# Patient Record
Sex: FEMALE | Race: WHITE | NOT HISPANIC OR LATINO | Employment: FULL TIME | URBAN - METROPOLITAN AREA
[De-identification: names, ages, dates, MRNs, and addresses within clinical notes are randomized per-mention and may not be internally consistent; named-entity substitution may affect disease eponyms.]

---

## 2017-10-12 ENCOUNTER — HISTORICAL (OUTPATIENT)
Dept: BARIATRICS | Facility: HOSPITAL | Age: 52
End: 2017-10-12

## 2017-11-01 ENCOUNTER — HISTORICAL (OUTPATIENT)
Dept: BARIATRICS | Facility: HOSPITAL | Age: 52
End: 2017-11-01

## 2021-11-01 ENCOUNTER — HISTORICAL (OUTPATIENT)
Dept: ADMINISTRATIVE | Facility: HOSPITAL | Age: 56
End: 2021-11-01

## 2022-04-07 ENCOUNTER — HISTORICAL (OUTPATIENT)
Dept: ADMINISTRATIVE | Facility: HOSPITAL | Age: 57
End: 2022-04-07

## 2022-04-23 VITALS
DIASTOLIC BLOOD PRESSURE: 98 MMHG | HEIGHT: 60 IN | SYSTOLIC BLOOD PRESSURE: 164 MMHG | WEIGHT: 255 LBS | BODY MASS INDEX: 50.06 KG/M2

## 2022-05-01 NOTE — HISTORICAL [facility]
This is a historical note converted from [place]. Formatting and pictures may have been removed.  Please reference [place] for original formatting and attached multimedia. Chief Complaint  pt here for YELENA knee pain, has a hx of meniscus repair sx unknown year by Dr. Cooper. both knees have bothered her for about 10 years now  History of Present Illness  56-year-old female presents office today for evaluation of her bilateral knee pain.? This been present for many years. ?Her pain is medial sided?and worse with weightbearing, activity and range of motion. ?She does have a history of?right knee meniscus repair?done many years ago.? She has had?viscosupplementation injections in the past with no significant pain relief.? She denies any history of injury  Review of Systems  Systemic: No fever, no chills, and no recent weight change.  Head: No headache - frequent.  Eyes: No vision problems.  Otolarnygeal: No hearing loss, no earache, no epistaxis, no hoarseness, and no tooth pain. Gums normal.  Cardiovascular: No chest pain or discomfort and no palpitations.  Pulmonary: No pulmonary symptoms - no dyspnea, no shortness of breath  Gastrointestinal: Appetite not decreased. No dysphagia and no constant eructation. No nausea, no vomiting, no abdominal pain, no hematochezia.  Genitourinary: No genitourinary symptoms - No urinary hesitancy. No urinary loss of control - no burning sensation during urination.  Musculoskeletal: No calf muscle cramps and no localized soft tissue swelling  Neurological: No fainting and no convulsions.  Psychological: no depression.  Skin: No rash.  Physical Exam  Vitals & Measurements  T:?37? ?C (Oral)? HR:?75(Peripheral)? BP:?164/98?  HT:?152.00?cm? WT:?115.660?kg? BMI:?50.06?  PHYSICAL FINDINGS  Cardiovascular:  Arterial Pulses: Posterior tibialis pulses were normal. Dorsalis pedis pulses were normal right.  Musculoskeletal System:  Thigh:  ?Thigh: Thigh showed quadriceps  atrophy.  Knee:  Right?knee:  Grade effusion 1  Genu varum. Patella demonstrated crepitus. Anteromedial aspect was tender on palpation. Medial aspect was tender on palpation. Medial collateral ligament was tender on palpation. Active motion.  Right?knee:  Knee Motion: Value  Active flexion?115 degrees  Active extension?5 degrees  Pain was elicited by flexion. No erythema. No warmth. No medial instability. No lateral instability. No one plane medial (straight) instability. No one plane lateral (straight) instability. A Lachman test did not demonstrate one plane anterior instability.  Neurological:  Gait And Stance: A right-sided antalgic gait was observed.  ???  ???  TESTS  Imaging:  X-Ray Knee:  A complete knee x-ray with standing views was performed -of?right knee.  AP and lateral view x-rays of the Right knee with sunrise view of the patella were performed -of?right knee.  ???  ???  IMPRESSIONS RADIOLOGY TEST  Narrowing of the joint space bone on bone?in the medial patellofemoral compartments, and osteophytes arising from the?right knee.  Kellgren Justin?grade 4 changes  After verbal consent right knee was prepped in sterile fashion. 2 mL of lidocaine and 2 mL of betamethasone was injected intra-articularly on a 25-gauge needle. Patient tolerated the procedure well.  ?   PHYSICAL FINDINGS  Cardiovascular:  Arterial Pulses: Posterior tibialis pulses were normal. Dorsalis pedis pulses were normal left.  Musculoskeletal System:  Thigh:  ?Thigh: Thigh showed quadriceps atrophy.  Knee:  Left?knee:  Grade effusion 1  Genu varum. Patella demonstrated crepitus. Anteromedial aspect was tender on palpation. Medial aspect was tender on palpation. Medial collateral ligament was tender on palpation. Active motion.  Left?knee:  Knee Motion: Value  Active flexion?115 degrees  Active extension?5 degrees  Pain was elicited by flexion. No erythema. No warmth. No medial instability. No lateral instability. No one plane medial  (straight) instability. No one plane lateral (straight) instability. A Lachman test did not demonstrate one plane anterior instability.  Neurological:  Gait And Stance: A left-sided antalgic gait was observed.  ???  ???  TESTS  Imaging:  X-Ray Knee:  A complete knee x-ray with standing views was performed -of?left knee.  AP and lateral view x-rays of the Right knee with sunrise view of the patella were performed -of?left knee.  ???  ???  IMPRESSIONS RADIOLOGY TEST  Narrowing of the joint space bone on bone?in the medial patellofemoral compartments, and osteophytes arising from the?left knee.  Kellgren Justin?grade 4 changes  After verbal consent left knee was prepped in sterile fashion. 2 mL of lidocaine and 2 mL of betamethasone was injected intra-articularly on a 25-gauge needle. Patient tolerated the procedure well.  Assessment/Plan  1.?Osteoarthritis of both knees?M17.0  ?Discussed with the patient today that she has advanced osteoarthritis of both knees. ?Currently her BMI is 50.? To become a surgical candidate for total knee arthroplasty we need to have her BMI?40 or below.? Recommend corticosteroid injections to both knees today and referral to [facility]?medical weight loss center. ?Follow-up with us as needed  Ordered:  betamethasone, 24 mg, Intra-Articular, Once, first dose 11/01/21 11:00:00 CDT, stop date 11/01/21 11:00:00 CDT  Lidocaine inj., 4 mL, Intra-Articular, Once, first dose 11/01/21 10:53:00 CDT, stop date 11/01/21 10:53:00 CDT  Asp/Inj (Bilateral) Major Jnt/Bursa [id number], 11/01/21 10:53:00 CDT, [facility], Routine, 11/01/21 10:53:00 CDT, Osteoarthritis of both knees  Morbid obesity  Clinic Follow-up PRN, 11/01/21 10:52:00 CDT, Future Order, [facility]  ?  2.?Morbid obesity?E66.01  Ordered:  betamethasone, 24 mg, Intra-Articular, Once, first dose 11/01/21 11:00:00 CDT, stop date 11/01/21 11:00:00 CDT  Lidocaine inj., 4 mL, Intra-Articular, Once, first dose 11/01/21  10:53:00 CDT, stop date 11/01/21 10:53:00 CDT  Asp/Inj (Bilateral) Major Jnt/Bursa [id number], 11/01/21 10:53:00 CDT, [facility], Routine, 11/01/21 10:53:00 CDT, Osteoarthritis of both knees  Morbid obesity  ?  Referrals  Clinic Follow-up PRN, 11/01/21 10:52:00 CDT, Future Order, [facility]   Problem List/Past Medical History  Ongoing  Acid reflux  Breast cancer  Breast cancer in female  Family history of breast cancer  HTN - Hypertension  Morbid obesity  Obesity  Osteoarthritis of both knees  Historical  No qualifying data  Procedure/Surgical History  Incision and drainage of hematoma, seroma or fluid collection (11/17/2020)  Biopsy or excision of lymph node(s); open, deep axillary node(s) (10/30/2020)  Mastectomy, partial (eg, lumpectomy, tylectomy, quadrantectomy, segmentectomy); (10/30/2020)  PARTIAL RIGHT MASTECTOMY NEEDLE LOC SENTINEL NODE BX (10/30/2020)  Vaginal hysterectomy, for uterus 250 g or less; (2004)  HYSTERECTOMY  right breast mastectomy - November 2020  Right knee menicus tear -- unknown year -- Dr. Cooper   Medications  Arimidex 1 mg oral tablet, 1 mg= 1 tab(s), Oral, Daily, 3 refills  benazepril 20 mg oral tablet, 20 mg= 1 tab(s), Oral, Daily  BiDil 20 mg-37.5 mg oral tablet, 1 tab(s), Oral, BID  Celestone, 24 mg, Intra-Articular, Once  D3-50 1250 mcg (50,000 intl units) oral capsule, 1250 mcg= 1 cap(s), Oral, qWeek  lidocaine 2% injectable solution, 4 mL, Intra-Articular, Once  metoprolol tartrate 25 mg oral tab, 25 mg= 1 tab(s), Oral, BID  mometasone 50 mcg/inh nasal spray, 1 spray(s), Both Nostrils, qPM  Tylenol Extra Strength 500 mg oral tablet, 1000 mg= 2 tab(s), Oral, q4hr, PRN  Allergies  No Known Medication Allergies  Social History  Abuse/Neglect  No, No, Yes, 11/01/2021  Alcohol  Never, 04/05/2021  Nutrition/Health  Regular, Good, Diet restrictions: none., 04/05/2021  Substance Use  Never, 04/05/2021  Tobacco  Never (less than 100 in lifetime), N/A,  11/01/2021  Family History  Diabetes mellitus type 2: Father.  Hypertension.: Mother and Father.  Primary malignant neoplasm of breast: Father.  Primary malignant neoplasm of female genital organ: Aunt and Aunt.  Thyroid cancer: Grandmother.  Health Maintenance  Health Maintenance  ???Pending?(in the next year)  ??? ??Due?  ??? ? ? ?Aspirin Therapy for CVD Prevention due??11/01/21??and every 1??year(s)  ??? ? ? ?Hypertension Management-Education due??11/01/21??and every 1??year(s)  ??? ? ? ?Zoster Vaccine due??11/01/21??Unknown Frequency  ??? ??Refused?  ??? ? ? ?Tetanus Vaccine due??11/01/21??and every 10??year(s)  ??? ??Due In Future?  ??? ? ? ?ADL Screening not due until??12/04/21??and every 1??year(s)  ??? ? ? ?Obesity Screening not due until??01/01/22??and every 1??year(s)  ??? ? ? ?Alcohol Misuse Screening not due until??01/02/22??and every 1??year(s)  ??? ? ? ?Hypertension Management-BMP not due until??07/05/22??and every 1??year(s)  ???Satisfied?(in the past 1 year)  ??? ??Satisfied?  ??? ? ? ?ADL Screening on??12/04/20.??Satisfied by Linsey Longo LPN  ??? ? ? ?Alcohol Misuse Screening on??10/07/21.??Satisfied by Melanie Astudillo  ??? ? ? ?Blood Pressure Screening on??11/01/21.??Satisfied by Michelle Rosa  ??? ? ? ?Body Mass Index Check on??11/01/21.??Satisfied by Michelle Rosa  ??? ? ? ?Depression Screening on??11/01/21.??Satisfied by Michelle Rosa  ??? ? ? ?Diabetes Screening on??10/07/21.??Satisfied by Mckenzie Scott  ??? ? ? ?Hypertension Management-Blood Pressure on??11/01/21.??Satisfied by Michelle Rosa  ??? ? ? ?Influenza Vaccine on??11/01/21.??Satisfied by Michelle Rosa  ??? ? ? ?Lipid Screening on??05/05/21.??Satisfied by Stephanie Coley  ??? ? ? ?Obesity Screening on??11/01/21.??Satisfied by Michelle Rosa  ??? ??Refused?  ??? ? ? ?Alcohol Misuse Screening on??04/05/21.??Recorded by Nathan Lobato LPN??Reason: Patient Refuses  ??? ? ? ?Cervical  Cancer Screening on??04/05/21.??Recorded by Nathan Lobato LPN??Reason: Patient Refuses  ??? ? ? ?Colorectal Screening on??04/05/21.??Recorded by Nathan Lobato LPN??Reason: Patient Refuses  ??? ? ? ?Tetanus Vaccine on??04/05/21.??Recorded by Nathan Lobato LPN??Reason: Patient Refuses  ??? ? ? ?Zoster Vaccine on??04/05/21.??Recorded by Nathan Lobato LPN??Reason: Patient Refuses  ?

## 2022-06-03 DIAGNOSIS — M25.561 ACUTE BILATERAL KNEE PAIN: Primary | ICD-10-CM

## 2022-06-03 DIAGNOSIS — M25.562 ACUTE BILATERAL KNEE PAIN: Primary | ICD-10-CM

## 2022-06-23 ENCOUNTER — HOSPITAL ENCOUNTER (OUTPATIENT)
Dept: RADIOLOGY | Facility: CLINIC | Age: 57
Discharge: HOME | End: 2022-06-23
Attending: ORTHOPAEDIC SURGERY
Payer: COMMERCIAL

## 2022-06-23 ENCOUNTER — OFFICE VISIT (OUTPATIENT)
Dept: ORTHOPEDICS | Facility: CLINIC | Age: 57
End: 2022-06-23
Payer: COMMERCIAL

## 2022-06-23 VITALS
HEIGHT: 61 IN | WEIGHT: 248 LBS | BODY MASS INDEX: 46.82 KG/M2 | SYSTOLIC BLOOD PRESSURE: 171 MMHG | HEART RATE: 72 BPM | DIASTOLIC BLOOD PRESSURE: 101 MMHG

## 2022-06-23 DIAGNOSIS — M25.562 PAIN IN BOTH KNEES, UNSPECIFIED CHRONICITY: Primary | ICD-10-CM

## 2022-06-23 DIAGNOSIS — M25.561 ACUTE BILATERAL KNEE PAIN: ICD-10-CM

## 2022-06-23 DIAGNOSIS — M25.561 PAIN IN BOTH KNEES, UNSPECIFIED CHRONICITY: Primary | ICD-10-CM

## 2022-06-23 DIAGNOSIS — M17.0 PRIMARY OSTEOARTHRITIS OF BOTH KNEES: ICD-10-CM

## 2022-06-23 DIAGNOSIS — M25.562 ACUTE BILATERAL KNEE PAIN: ICD-10-CM

## 2022-06-23 PROCEDURE — 20610 DRAIN/INJ JOINT/BURSA W/O US: CPT | Mod: 50,,, | Performed by: ORTHOPAEDIC SURGERY

## 2022-06-23 PROCEDURE — 3008F BODY MASS INDEX DOCD: CPT | Mod: CPTII,,, | Performed by: ORTHOPAEDIC SURGERY

## 2022-06-23 PROCEDURE — 4010F PR ACE/ARB THEARPY RXD/TAKEN: ICD-10-PCS | Mod: CPTII,,, | Performed by: ORTHOPAEDIC SURGERY

## 2022-06-23 PROCEDURE — 3077F SYST BP >= 140 MM HG: CPT | Mod: CPTII,,, | Performed by: ORTHOPAEDIC SURGERY

## 2022-06-23 PROCEDURE — 1160F RVW MEDS BY RX/DR IN RCRD: CPT | Mod: CPTII,,, | Performed by: ORTHOPAEDIC SURGERY

## 2022-06-23 PROCEDURE — 3080F PR MOST RECENT DIASTOLIC BLOOD PRESSURE >= 90 MM HG: ICD-10-PCS | Mod: CPTII,,, | Performed by: ORTHOPAEDIC SURGERY

## 2022-06-23 PROCEDURE — 73564 X-RAY EXAM KNEE 4 OR MORE: CPT | Mod: ,,, | Performed by: ORTHOPAEDIC SURGERY

## 2022-06-23 PROCEDURE — 3008F PR BODY MASS INDEX (BMI) DOCUMENTED: ICD-10-PCS | Mod: CPTII,,, | Performed by: ORTHOPAEDIC SURGERY

## 2022-06-23 PROCEDURE — 1159F MED LIST DOCD IN RCRD: CPT | Mod: CPTII,,, | Performed by: ORTHOPAEDIC SURGERY

## 2022-06-23 PROCEDURE — 73564 PR  X-RAY KNEE 4+ VIEW: ICD-10-PCS | Mod: ,,, | Performed by: ORTHOPAEDIC SURGERY

## 2022-06-23 PROCEDURE — 4010F ACE/ARB THERAPY RXD/TAKEN: CPT | Mod: CPTII,,, | Performed by: ORTHOPAEDIC SURGERY

## 2022-06-23 PROCEDURE — 99213 PR OFFICE/OUTPT VISIT, EST, LEVL III, 20-29 MIN: ICD-10-PCS | Mod: 25,,, | Performed by: ORTHOPAEDIC SURGERY

## 2022-06-23 PROCEDURE — 99213 OFFICE O/P EST LOW 20 MIN: CPT | Mod: 25,,, | Performed by: ORTHOPAEDIC SURGERY

## 2022-06-23 PROCEDURE — 3080F DIAST BP >= 90 MM HG: CPT | Mod: CPTII,,, | Performed by: ORTHOPAEDIC SURGERY

## 2022-06-23 PROCEDURE — 1160F PR REVIEW ALL MEDS BY PRESCRIBER/CLIN PHARMACIST DOCUMENTED: ICD-10-PCS | Mod: CPTII,,, | Performed by: ORTHOPAEDIC SURGERY

## 2022-06-23 PROCEDURE — 20610 LARGE JOINT ASPIRATION/INJECTION: BILATERAL KNEE: ICD-10-PCS | Mod: 50,,, | Performed by: ORTHOPAEDIC SURGERY

## 2022-06-23 PROCEDURE — 3077F PR MOST RECENT SYSTOLIC BLOOD PRESSURE >= 140 MM HG: ICD-10-PCS | Mod: CPTII,,, | Performed by: ORTHOPAEDIC SURGERY

## 2022-06-23 PROCEDURE — 1159F PR MEDICATION LIST DOCUMENTED IN MEDICAL RECORD: ICD-10-PCS | Mod: CPTII,,, | Performed by: ORTHOPAEDIC SURGERY

## 2022-06-23 RX ORDER — LIDOCAINE HYDROCHLORIDE 20 MG/ML
3 INJECTION, SOLUTION INFILTRATION; PERINEURAL
Status: DISCONTINUED | OUTPATIENT
Start: 2022-06-23 | End: 2022-06-23 | Stop reason: HOSPADM

## 2022-06-23 RX ORDER — BETAMETHASONE SODIUM PHOSPHATE AND BETAMETHASONE ACETATE 3; 3 MG/ML; MG/ML
12 INJECTION, SUSPENSION INTRA-ARTICULAR; INTRALESIONAL; INTRAMUSCULAR; SOFT TISSUE
Status: DISCONTINUED | OUTPATIENT
Start: 2022-06-23 | End: 2022-06-23 | Stop reason: HOSPADM

## 2022-06-23 RX ORDER — ISOSORBIDE DINITRATE 20 MG/1
20 TABLET ORAL 2 TIMES DAILY
Status: ON HOLD | COMMUNITY
Start: 2022-06-01 | End: 2023-12-01

## 2022-06-23 RX ORDER — BENAZEPRIL HYDROCHLORIDE 20 MG/1
20 TABLET ORAL DAILY
COMMUNITY
Start: 2022-06-11

## 2022-06-23 RX ORDER — ANASTROZOLE 1 MG/1
1 TABLET ORAL DAILY
COMMUNITY
Start: 2022-05-27

## 2022-06-23 RX ORDER — METOPROLOL SUCCINATE 25 MG/1
25 TABLET, EXTENDED RELEASE ORAL DAILY
Status: ON HOLD | COMMUNITY
End: 2023-12-01 | Stop reason: CLARIF

## 2022-06-23 RX ADMIN — LIDOCAINE HYDROCHLORIDE 3 MG: 20 INJECTION, SOLUTION INFILTRATION; PERINEURAL at 01:06

## 2022-06-23 RX ADMIN — BETAMETHASONE SODIUM PHOSPHATE AND BETAMETHASONE ACETATE 12 MG: 3; 3 INJECTION, SUSPENSION INTRA-ARTICULAR; INTRALESIONAL; INTRAMUSCULAR; SOFT TISSUE at 01:06

## 2022-06-23 NOTE — PROGRESS NOTES
Subjective:    CC: Pain (YELENA Knee pain, referred by Dr. Childers, pt states pain is a aching pain, hurts all the time, sometimes keeps pt up at night, pt stated pain started about years ago,)       HPI:  Patient comes in today complaining of bilateral knee pain right equal to left.  She has been having pain for many years.  She had has a longstanding history of arthritis.  She has pain when getting up from a seated position long standing walking.  She denies any new trauma she denies other complaints.    ROS: Refer to HPI for pertinent ROS. All other 12 point systems negative.    Objective:    Physical Exam:  The patient is well-nourished, well-developed, in no apparent distress, pleasant and cooperative. Examination of the bilateral lower extremities,  compartments are soft and warm. Skin is intact. There are no signs or symptoms of DVT or infection. There is no obvious joint effusion. There is no erythema. Tenderness to palpation along the mediolateral joint line, right knee range of motion is 0-100; left knee range of motion is 0-100. The knee is stable to stressing with varus and valgus. Negative anterior and posterior drawer.   Negative Lachman´s.  Negative Kenna's test. Patella grind is positive, negative for apprehension.  Patient is neurovascularly intact distally.      Images:  X-rays three views of left and right knee demonstrate severe bone-on-bone arthritis with varus deformity. Images Reviewed and discussed with patient.    Assessment:  1. Acute bilateral knee pain  - Ambulatory referral/consult to Orthopedics    2. Pain in both knees, unspecified chronicity  - X-Ray Knee Complete 4 Or More Views Bilat; Future  - X-Ray Knee Complete 4 Or More Views Bilat    3. Primary osteoarthritis of both knees  - Large Joint Aspiration/Injection: bilateral knee  - betamethasone acetate-betamethasone sodium phosphate injection 12 mg  - LIDOcaine HCL 20 mg/ml (2%) injection 3 mg  - betamethasone acetate-betamethasone  sodium phosphate injection 12 mg  - LIDOcaine HCL 20 mg/ml (2%) injection 3 mg        Plan:   at this time we discussed her physical exam and x-ray findings.  She tolerated her steroid injection very well both knees we have discussed low-impact activities, we have discussed her elevated BMI, we will hold off on a knee replacement at this time.  I would like see back in 6 weeks to see how she is progressing.    Follow UP: Follow up in about 6 weeks (around 8/4/2022).    Large Joint Aspiration/Injection: bilateral knee    Date/Time: 6/23/2022 1:15 PM  Performed by: Tomas Gordillo MD  Authorized by: Tomas Gordillo MD     Consent Done?:  Yes (Verbal)  Indications:  Pain  Site marked: the procedure site was marked    Prep: patient was prepped and draped in usual sterile fashion    Approach:  Anterolateral  Location:  Knee  Laterality:  Bilateral  Site:  Bilateral knee  Medications (Right):  12 mg betamethasone acetate-betamethasone sodium phosphate 6 mg/mL; 3 mg LIDOcaine HCL 20 mg/ml (2%) 20 mg/mL (2 %)  Medications (Left):  12 mg betamethasone acetate-betamethasone sodium phosphate 6 mg/mL; 3 mg LIDOcaine HCL 20 mg/ml (2%) 20 mg/mL (2 %)  Patient tolerance:  Patient tolerated the procedure well with no immediate complications

## 2022-06-23 NOTE — PROCEDURES
Large Joint Aspiration/Injection: bilateral knee    Date/Time: 6/23/2022 1:15 PM  Performed by: Tomas Gordillo MD  Authorized by: Tomas Gordillo MD     Consent Done?:  Yes (Verbal)  Indications:  Pain  Site marked: the procedure site was marked    Prep: patient was prepped and draped in usual sterile fashion    Approach:  Anterolateral  Location:  Knee  Laterality:  Bilateral  Site:  Bilateral knee  Medications (Right):  12 mg betamethasone acetate-betamethasone sodium phosphate 6 mg/mL; 3 mg LIDOcaine HCL 20 mg/ml (2%) 20 mg/mL (2 %)  Medications (Left):  12 mg betamethasone acetate-betamethasone sodium phosphate 6 mg/mL; 3 mg LIDOcaine HCL 20 mg/ml (2%) 20 mg/mL (2 %)  Patient tolerance:  Patient tolerated the procedure well with no immediate complications

## 2022-08-04 ENCOUNTER — OFFICE VISIT (OUTPATIENT)
Dept: ORTHOPEDICS | Facility: CLINIC | Age: 57
End: 2022-08-04
Payer: COMMERCIAL

## 2022-08-04 VITALS — WEIGHT: 248 LBS | BODY MASS INDEX: 46.82 KG/M2 | HEIGHT: 61 IN

## 2022-08-04 DIAGNOSIS — M17.0 PRIMARY OSTEOARTHRITIS OF BOTH KNEES: Primary | ICD-10-CM

## 2022-08-04 PROCEDURE — 1160F PR REVIEW ALL MEDS BY PRESCRIBER/CLIN PHARMACIST DOCUMENTED: ICD-10-PCS | Mod: CPTII,,, | Performed by: ORTHOPAEDIC SURGERY

## 2022-08-04 PROCEDURE — 1159F MED LIST DOCD IN RCRD: CPT | Mod: CPTII,,, | Performed by: ORTHOPAEDIC SURGERY

## 2022-08-04 PROCEDURE — 3008F PR BODY MASS INDEX (BMI) DOCUMENTED: ICD-10-PCS | Mod: CPTII,,, | Performed by: ORTHOPAEDIC SURGERY

## 2022-08-04 PROCEDURE — 99213 OFFICE O/P EST LOW 20 MIN: CPT | Mod: ,,, | Performed by: ORTHOPAEDIC SURGERY

## 2022-08-04 PROCEDURE — 1159F PR MEDICATION LIST DOCUMENTED IN MEDICAL RECORD: ICD-10-PCS | Mod: CPTII,,, | Performed by: ORTHOPAEDIC SURGERY

## 2022-08-04 PROCEDURE — 4010F PR ACE/ARB THEARPY RXD/TAKEN: ICD-10-PCS | Mod: CPTII,,, | Performed by: ORTHOPAEDIC SURGERY

## 2022-08-04 PROCEDURE — 1160F RVW MEDS BY RX/DR IN RCRD: CPT | Mod: CPTII,,, | Performed by: ORTHOPAEDIC SURGERY

## 2022-08-04 PROCEDURE — 99213 PR OFFICE/OUTPT VISIT, EST, LEVL III, 20-29 MIN: ICD-10-PCS | Mod: ,,, | Performed by: ORTHOPAEDIC SURGERY

## 2022-08-04 PROCEDURE — 3008F BODY MASS INDEX DOCD: CPT | Mod: CPTII,,, | Performed by: ORTHOPAEDIC SURGERY

## 2022-08-04 PROCEDURE — 4010F ACE/ARB THERAPY RXD/TAKEN: CPT | Mod: CPTII,,, | Performed by: ORTHOPAEDIC SURGERY

## 2022-08-04 NOTE — PROGRESS NOTES
Subjective:    CC: Follow-up (YELENA KNEE INJECT 6/23/22 WITH RELIEF ,PAIN IS STILL THERE. NOT LIKE BEFORE )       HPI:  Patient returns today for repeat exam.  Patient is 6 weeks from her bilateral knee injections.  She states the injections did help, she states she still does have pain especially will get her of the same position long standing walking otherwise his improved.  We have discussed her elevated BMI.    ROS: Refer to HPI for pertinent ROS. All other 12 point systems negative.    Objective:    Physical Exam:  The patient is well-nourished, well-developed, in no apparent distress, pleasant and cooperative. Examination of the bilateral lower extremities,  compartments are soft and warm. Skin is intact. There are no signs or symptoms of DVT or infection. There is no obvious joint effusion. There is no erythema. Tenderness to palpation along the medial joint line, right knee range of motion is are 5-105; left knee range of motion is 5-105. The knee is stable to stressing with varus and valgus. Negative anterior and posterior drawer.   Negative Lachman´s.  Negative Kenna's test. Patella grind is positive, negative for apprehension.  Patient is neurovascularly intact distally.      Images: . Images Reviewed and discussed with patient.    Assessment:  1. Primary osteoarthritis of both knees        Plan:  At this time we discussed her physical exam and previous imaging findings.  She is improving nicely with conservative treatment.  We have discussed given her bone-on-bone arthritis she is likely headed towards a knee replacement though given her elevated BMI we will continue injections at this time.  She will continue low-impact activities.  I would like see back in 6 weeks for likely repeat injections to both knees.    Follow UP: Follow up in about 6 weeks (around 9/15/2022).

## 2022-09-15 ENCOUNTER — OFFICE VISIT (OUTPATIENT)
Dept: ORTHOPEDICS | Facility: CLINIC | Age: 57
End: 2022-09-15
Payer: COMMERCIAL

## 2022-09-15 VITALS — BODY MASS INDEX: 46.82 KG/M2 | WEIGHT: 248 LBS | HEIGHT: 61 IN

## 2022-09-15 DIAGNOSIS — M17.0 BILATERAL PRIMARY OSTEOARTHRITIS OF KNEE: Primary | ICD-10-CM

## 2022-09-15 PROCEDURE — 20610 LARGE JOINT ASPIRATION/INJECTION: BILATERAL KNEE: ICD-10-PCS | Mod: 50,,,

## 2022-09-15 PROCEDURE — 4010F PR ACE/ARB THEARPY RXD/TAKEN: ICD-10-PCS | Mod: CPTII,,,

## 2022-09-15 PROCEDURE — 1159F MED LIST DOCD IN RCRD: CPT | Mod: CPTII,,,

## 2022-09-15 PROCEDURE — 1160F RVW MEDS BY RX/DR IN RCRD: CPT | Mod: CPTII,,,

## 2022-09-15 PROCEDURE — 4010F ACE/ARB THERAPY RXD/TAKEN: CPT | Mod: CPTII,,,

## 2022-09-15 PROCEDURE — 20610 DRAIN/INJ JOINT/BURSA W/O US: CPT | Mod: 50,,,

## 2022-09-15 PROCEDURE — 3008F BODY MASS INDEX DOCD: CPT | Mod: CPTII,,,

## 2022-09-15 PROCEDURE — 1160F PR REVIEW ALL MEDS BY PRESCRIBER/CLIN PHARMACIST DOCUMENTED: ICD-10-PCS | Mod: CPTII,,,

## 2022-09-15 PROCEDURE — 3008F PR BODY MASS INDEX (BMI) DOCUMENTED: ICD-10-PCS | Mod: CPTII,,,

## 2022-09-15 PROCEDURE — 1159F PR MEDICATION LIST DOCUMENTED IN MEDICAL RECORD: ICD-10-PCS | Mod: CPTII,,,

## 2022-09-15 PROCEDURE — 99213 OFFICE O/P EST LOW 20 MIN: CPT | Mod: 25,,,

## 2022-09-15 PROCEDURE — 99213 PR OFFICE/OUTPT VISIT, EST, LEVL III, 20-29 MIN: ICD-10-PCS | Mod: 25,,,

## 2022-09-15 RX ORDER — BETAMETHASONE SODIUM PHOSPHATE AND BETAMETHASONE ACETATE 3; 3 MG/ML; MG/ML
12 INJECTION, SUSPENSION INTRA-ARTICULAR; INTRALESIONAL; INTRAMUSCULAR; SOFT TISSUE
Status: SHIPPED | OUTPATIENT
Start: 2022-09-15

## 2022-09-15 RX ORDER — LIDOCAINE HYDROCHLORIDE 20 MG/ML
3 INJECTION, SOLUTION INFILTRATION; PERINEURAL
Status: SHIPPED | OUTPATIENT
Start: 2022-09-15

## 2022-09-15 RX ADMIN — LIDOCAINE HYDROCHLORIDE 3 MG: 20 INJECTION, SOLUTION INFILTRATION; PERINEURAL at 09:09

## 2022-09-15 RX ADMIN — BETAMETHASONE SODIUM PHOSPHATE AND BETAMETHASONE ACETATE 12 MG: 3; 3 INJECTION, SUSPENSION INTRA-ARTICULAR; INTRALESIONAL; INTRAMUSCULAR; SOFT TISSUE at 09:09

## 2022-09-15 NOTE — PROCEDURES
Large Joint Aspiration/Injection: bilateral knee    Date/Time: 9/15/2022 9:15 AM  Performed by: Nava Granado PA-C  Authorized by: Nava Granado PA-C     Consent Done?:  Yes (Verbal)  Indications:  Pain  Site marked: the procedure site was marked    Prep: patient was prepped and draped in usual sterile fashion    Approach:  Anterolateral  Location:  Knee  Laterality:  Bilateral  Site:  Bilateral knee  Medications (Right):  12 mg betamethasone acetate-betamethasone sodium phosphate 6 mg/mL; 3 mg LIDOcaine HCL 20 mg/ml (2%) 20 mg/mL (2 %)  Medications (Left):  12 mg betamethasone acetate-betamethasone sodium phosphate 6 mg/mL; 3 mg LIDOcaine HCL 20 mg/ml (2%) 20 mg/mL (2 %)  Patient tolerance:  Patient tolerated the procedure well with no immediate complications

## 2022-09-15 NOTE — PROGRESS NOTES
Subjective:    CC: Follow-up (YELENA knee F/u, pt states injections have been a major help with pain relief, pt states exercsie has helped with relief as well, overall pt states doing better than before, )       HPI:  Patient presents to clinic for repeat evaluation of bilateral knees.  Right knee worse than left.  Last set of injections 6 months ago.  She states that these injection have been a major help however her pain is starting to return.  She is currently taking ibuprofen with some relief.  She continues to have pain with long standing, walking and bending.  She would like to repeat these injections when she can.  She states she is doing rowing and cycling for exercise.  No new complaints.    ROS: Refer to HPI for pertinent ROS. All other 12 point systems negative.    Objective:    There were no vitals filed for this visit.     Physical Exam: The patient is well-nourished, well-developed, in no apparent distress, pleasant and cooperative. Examination of the bilateral lower extremities,  compartments are soft and warm. Skin is intact. There are no signs or symptoms of DVT or infection. There is no obvious joint effusion. There is no erythema. Tenderness to palpation along the medial joint line, right knee range of motion is are 5-105; left knee range of motion is 5-105. The knee is stable to stressing with varus and valgus. Negative anterior and posterior drawer.   Negative Lachman´s.  Negative Knena's test. Patella grind is positive, negative for apprehension.  Patient is neurovascularly intact distally.    Images:  Previous Images Reviewed and discussed with patient.    Assessment:  1. Bilateral primary osteoarthritis of knee  - Large Joint Aspiration/Injection: bilateral knee  - LIDOcaine HCL 20 mg/ml (2%) injection 3 mg  - LIDOcaine HCL 20 mg/ml (2%) injection 3 mg  - betamethasone acetate-betamethasone sodium phosphate injection 12 mg  - betamethasone acetate-betamethasone sodium phosphate injection 12  mg       Plan:  Physical exam and previous imaging findings again discussed with patient.  Patient tolerated bilateral knee injections well today.  She will continue OTC anti-inflammatories and low-impact activities.  She will continue to work on lowering her BMI.  I would like to the patient back in 3 months to assess her progress with possible repeat injections.    Follow up: Follow up in about 3 months (around 12/15/2022).    Large Joint Aspiration/Injection: bilateral knee    Date/Time: 9/15/2022 9:15 AM  Performed by: Nava Granado PA-C  Authorized by: Nava Granado PA-C     Consent Done?:  Yes (Verbal)  Indications:  Pain  Site marked: the procedure site was marked    Prep: patient was prepped and draped in usual sterile fashion    Approach:  Anterolateral  Location:  Knee  Laterality:  Bilateral  Site:  Bilateral knee  Medications (Right):  12 mg betamethasone acetate-betamethasone sodium phosphate 6 mg/mL; 3 mg LIDOcaine HCL 20 mg/ml (2%) 20 mg/mL (2 %)  Medications (Left):  12 mg betamethasone acetate-betamethasone sodium phosphate 6 mg/mL; 3 mg LIDOcaine HCL 20 mg/ml (2%) 20 mg/mL (2 %)  Patient tolerance:  Patient tolerated the procedure well with no immediate complications

## 2022-12-15 ENCOUNTER — OFFICE VISIT (OUTPATIENT)
Dept: ORTHOPEDICS | Facility: CLINIC | Age: 57
End: 2022-12-15
Payer: COMMERCIAL

## 2022-12-15 VITALS — HEIGHT: 61 IN | BODY MASS INDEX: 47.58 KG/M2 | WEIGHT: 252 LBS

## 2022-12-15 DIAGNOSIS — M17.0 BILATERAL PRIMARY OSTEOARTHRITIS OF KNEE: Primary | ICD-10-CM

## 2022-12-15 PROCEDURE — 1159F PR MEDICATION LIST DOCUMENTED IN MEDICAL RECORD: ICD-10-PCS | Mod: CPTII,,, | Performed by: ORTHOPAEDIC SURGERY

## 2022-12-15 PROCEDURE — 20610 DRAIN/INJ JOINT/BURSA W/O US: CPT | Mod: 50,,, | Performed by: ORTHOPAEDIC SURGERY

## 2022-12-15 PROCEDURE — 1159F MED LIST DOCD IN RCRD: CPT | Mod: CPTII,,, | Performed by: ORTHOPAEDIC SURGERY

## 2022-12-15 PROCEDURE — 1160F RVW MEDS BY RX/DR IN RCRD: CPT | Mod: CPTII,,, | Performed by: ORTHOPAEDIC SURGERY

## 2022-12-15 PROCEDURE — 4010F ACE/ARB THERAPY RXD/TAKEN: CPT | Mod: CPTII,,, | Performed by: ORTHOPAEDIC SURGERY

## 2022-12-15 PROCEDURE — 20610 LARGE JOINT ASPIRATION/INJECTION: BILATERAL KNEE: ICD-10-PCS | Mod: 50,,, | Performed by: ORTHOPAEDIC SURGERY

## 2022-12-15 PROCEDURE — 4010F PR ACE/ARB THEARPY RXD/TAKEN: ICD-10-PCS | Mod: CPTII,,, | Performed by: ORTHOPAEDIC SURGERY

## 2022-12-15 PROCEDURE — 3008F BODY MASS INDEX DOCD: CPT | Mod: CPTII,,, | Performed by: ORTHOPAEDIC SURGERY

## 2022-12-15 PROCEDURE — 1160F PR REVIEW ALL MEDS BY PRESCRIBER/CLIN PHARMACIST DOCUMENTED: ICD-10-PCS | Mod: CPTII,,, | Performed by: ORTHOPAEDIC SURGERY

## 2022-12-15 PROCEDURE — 3008F PR BODY MASS INDEX (BMI) DOCUMENTED: ICD-10-PCS | Mod: CPTII,,, | Performed by: ORTHOPAEDIC SURGERY

## 2022-12-15 PROCEDURE — 99214 PR OFFICE/OUTPT VISIT, EST, LEVL IV, 30-39 MIN: ICD-10-PCS | Mod: 25,,, | Performed by: ORTHOPAEDIC SURGERY

## 2022-12-15 PROCEDURE — 99214 OFFICE O/P EST MOD 30 MIN: CPT | Mod: 25,,, | Performed by: ORTHOPAEDIC SURGERY

## 2022-12-15 RX ORDER — LIDOCAINE HYDROCHLORIDE 20 MG/ML
3 INJECTION, SOLUTION INFILTRATION; PERINEURAL
Status: DISCONTINUED | OUTPATIENT
Start: 2022-12-15 | End: 2022-12-15 | Stop reason: HOSPADM

## 2022-12-15 RX ORDER — BETAMETHASONE SODIUM PHOSPHATE AND BETAMETHASONE ACETATE 3; 3 MG/ML; MG/ML
12 INJECTION, SUSPENSION INTRA-ARTICULAR; INTRALESIONAL; INTRAMUSCULAR; SOFT TISSUE
Status: DISCONTINUED | OUTPATIENT
Start: 2022-12-15 | End: 2022-12-15 | Stop reason: HOSPADM

## 2022-12-15 RX ADMIN — BETAMETHASONE SODIUM PHOSPHATE AND BETAMETHASONE ACETATE 12 MG: 3; 3 INJECTION, SUSPENSION INTRA-ARTICULAR; INTRALESIONAL; INTRAMUSCULAR; SOFT TISSUE at 09:12

## 2022-12-15 RX ADMIN — LIDOCAINE HYDROCHLORIDE 3 MG: 20 INJECTION, SOLUTION INFILTRATION; PERINEURAL at 09:12

## 2022-12-15 NOTE — PROGRESS NOTES
"Subjective:    CC: Pain of the Right Knee, Pain of the Left Knee, and Injections (eugenia knee injections on 9-. Pt states that the injections did give her a little relief. Denies any swelling. )       HPI:  Patient returns today for repeat exam.  Patient continues to have pain in both knees especially long standing walking and bending.  She is done very well in the past with injections, she would like to repeat these today.    ROS: Refer to [facility] for pertinent ROS. All other 12 point systems negative.    Objective:  Vitals:    12/15/22 0914   Weight: 114.3 kg (252 lb)   Height: 5' 1" (1.549 m)        Physical Exam:  The patient is well-nourished, well-developed, in no apparent distress, pleasant and cooperative. Examination of the bilateral lower extremities,  compartments are soft and warm. Skin is intact. There are no signs or symptoms of DVT or infection. There is no obvious joint effusion. There is no erythema. Tenderness to palpation along the mediolateral joint line, right knee range of motion is 0-95; left knee range of motion is 0-100. The knee is stable to stressing with varus and valgus. Negative anterior and posterior drawer.   Negative Lachman´s.  Negative Kenna's test. Patella grind is positive, negative for apprehension.  Patient is neurovascularly intact distally.      Images:  Reviewed. Images Reviewed and discussed with patient.    Assessment:  1. Bilateral primary osteoarthritis of knee  - Large Joint Aspiration/Injection: bilateral knee  - LIDOcaine HCL 20 mg/ml (2%) injection 3 mg  - LIDOcaine HCL 20 mg/ml (2%) injection 3 mg  - betamethasone acetate-betamethasone sodium phosphate injection 12 mg  - betamethasone acetate-betamethasone sodium phosphate injection 12 mg        Plan:  At this time we discussed her physical exam and previous imaging findings.  She tolerated the injection very well to the left and right knee, we have discussed low-impact activities, we have discussed her elevated " BMI.  I would like see back in 3 months to see how she is progressing.    Follow UP: Follow up in about 3 months (around 3/15/2023).    Large Joint Aspiration/Injection: bilateral knee    Date/Time: 12/15/2022 9:15 AM  Performed by: Tomas Gordillo MD  Authorized by: Tomas Gordillo MD     Consent Done?:  Yes (Verbal)  Indications:  Pain  Site marked: the procedure site was marked    Prep: patient was prepped and draped in usual sterile fashion    Approach:  Anterolateral  Location:  Knee  Laterality:  Bilateral  Site:  Bilateral knee  Medications (Right):  12 mg betamethasone acetate-betamethasone sodium phosphate 6 mg/mL; 3 mg LIDOcaine HCL 20 mg/ml (2%) 20 mg/mL (2 %)  Medications (Left):  12 mg betamethasone acetate-betamethasone sodium phosphate 6 mg/mL; 3 mg LIDOcaine HCL 20 mg/ml (2%) 20 mg/mL (2 %)  Patient tolerance:  Patient tolerated the procedure well with no immediate complications

## 2022-12-15 NOTE — PROCEDURES
Large Joint Aspiration/Injection: bilateral knee    Date/Time: 12/15/2022 9:15 AM  Performed by: Tomas Gordillo MD  Authorized by: Tomas Gordillo MD     Consent Done?:  Yes (Verbal)  Indications:  Pain  Site marked: the procedure site was marked    Prep: patient was prepped and draped in usual sterile fashion    Approach:  Anterolateral  Location:  Knee  Laterality:  Bilateral  Site:  Bilateral knee  Medications (Right):  12 mg betamethasone acetate-betamethasone sodium phosphate 6 mg/mL; 3 mg LIDOcaine HCL 20 mg/ml (2%) 20 mg/mL (2 %)  Medications (Left):  12 mg betamethasone acetate-betamethasone sodium phosphate 6 mg/mL; 3 mg LIDOcaine HCL 20 mg/ml (2%) 20 mg/mL (2 %)  Patient tolerance:  Patient tolerated the procedure well with no immediate complications

## 2023-03-16 ENCOUNTER — OFFICE VISIT (OUTPATIENT)
Dept: ORTHOPEDICS | Facility: CLINIC | Age: 58
End: 2023-03-16
Payer: COMMERCIAL

## 2023-03-16 VITALS
SYSTOLIC BLOOD PRESSURE: 164 MMHG | TEMPERATURE: 99 F | HEIGHT: 61 IN | BODY MASS INDEX: 46.38 KG/M2 | DIASTOLIC BLOOD PRESSURE: 100 MMHG | WEIGHT: 245.63 LBS | HEART RATE: 93 BPM

## 2023-03-16 DIAGNOSIS — M17.0 BILATERAL PRIMARY OSTEOARTHRITIS OF KNEE: Primary | ICD-10-CM

## 2023-03-16 PROCEDURE — 3077F SYST BP >= 140 MM HG: CPT | Mod: CPTII,,, | Performed by: ORTHOPAEDIC SURGERY

## 2023-03-16 PROCEDURE — 3080F PR MOST RECENT DIASTOLIC BLOOD PRESSURE >= 90 MM HG: ICD-10-PCS | Mod: CPTII,,, | Performed by: ORTHOPAEDIC SURGERY

## 2023-03-16 PROCEDURE — 3080F DIAST BP >= 90 MM HG: CPT | Mod: CPTII,,, | Performed by: ORTHOPAEDIC SURGERY

## 2023-03-16 PROCEDURE — 99214 PR OFFICE/OUTPT VISIT, EST, LEVL IV, 30-39 MIN: ICD-10-PCS | Mod: 25,,, | Performed by: ORTHOPAEDIC SURGERY

## 2023-03-16 PROCEDURE — 1159F PR MEDICATION LIST DOCUMENTED IN MEDICAL RECORD: ICD-10-PCS | Mod: CPTII,,, | Performed by: ORTHOPAEDIC SURGERY

## 2023-03-16 PROCEDURE — 1160F PR REVIEW ALL MEDS BY PRESCRIBER/CLIN PHARMACIST DOCUMENTED: ICD-10-PCS | Mod: CPTII,,, | Performed by: ORTHOPAEDIC SURGERY

## 2023-03-16 PROCEDURE — 4010F PR ACE/ARB THEARPY RXD/TAKEN: ICD-10-PCS | Mod: CPTII,,, | Performed by: ORTHOPAEDIC SURGERY

## 2023-03-16 PROCEDURE — 3077F PR MOST RECENT SYSTOLIC BLOOD PRESSURE >= 140 MM HG: ICD-10-PCS | Mod: CPTII,,, | Performed by: ORTHOPAEDIC SURGERY

## 2023-03-16 PROCEDURE — 20610 DRAIN/INJ JOINT/BURSA W/O US: CPT | Mod: 50,,, | Performed by: ORTHOPAEDIC SURGERY

## 2023-03-16 PROCEDURE — 1160F RVW MEDS BY RX/DR IN RCRD: CPT | Mod: CPTII,,, | Performed by: ORTHOPAEDIC SURGERY

## 2023-03-16 PROCEDURE — 4010F ACE/ARB THERAPY RXD/TAKEN: CPT | Mod: CPTII,,, | Performed by: ORTHOPAEDIC SURGERY

## 2023-03-16 PROCEDURE — 3008F PR BODY MASS INDEX (BMI) DOCUMENTED: ICD-10-PCS | Mod: CPTII,,, | Performed by: ORTHOPAEDIC SURGERY

## 2023-03-16 PROCEDURE — 20610 LARGE JOINT ASPIRATION/INJECTION: BILATERAL KNEE: ICD-10-PCS | Mod: 50,,, | Performed by: ORTHOPAEDIC SURGERY

## 2023-03-16 PROCEDURE — 1159F MED LIST DOCD IN RCRD: CPT | Mod: CPTII,,, | Performed by: ORTHOPAEDIC SURGERY

## 2023-03-16 PROCEDURE — 3008F BODY MASS INDEX DOCD: CPT | Mod: CPTII,,, | Performed by: ORTHOPAEDIC SURGERY

## 2023-03-16 PROCEDURE — 99214 OFFICE O/P EST MOD 30 MIN: CPT | Mod: 25,,, | Performed by: ORTHOPAEDIC SURGERY

## 2023-03-16 RX ORDER — LIDOCAINE HYDROCHLORIDE 20 MG/ML
3 INJECTION, SOLUTION INFILTRATION; PERINEURAL
Status: DISCONTINUED | OUTPATIENT
Start: 2023-03-16 | End: 2023-03-16 | Stop reason: HOSPADM

## 2023-03-16 RX ORDER — BETAMETHASONE SODIUM PHOSPHATE AND BETAMETHASONE ACETATE 3; 3 MG/ML; MG/ML
12 INJECTION, SUSPENSION INTRA-ARTICULAR; INTRALESIONAL; INTRAMUSCULAR; SOFT TISSUE
Status: DISCONTINUED | OUTPATIENT
Start: 2023-03-16 | End: 2023-03-16 | Stop reason: HOSPADM

## 2023-03-16 RX ORDER — FLUTICASONE PROPIONATE 50 MCG
1 SPRAY, SUSPENSION (ML) NASAL
COMMUNITY
Start: 2023-03-03

## 2023-03-16 RX ORDER — METOPROLOL TARTRATE 25 MG/1
TABLET, FILM COATED ORAL
Status: ON HOLD | COMMUNITY
End: 2023-12-01 | Stop reason: CLARIF

## 2023-03-16 RX ORDER — ISOSORBIDE DINITRATE AND HYDRALAZINE HYDROCHLORIDE 37.5; 2 MG/1; MG/1
1 TABLET ORAL 2 TIMES DAILY
COMMUNITY

## 2023-03-16 RX ORDER — ASPIRIN 325 MG
50000 TABLET, DELAYED RELEASE (ENTERIC COATED) ORAL
COMMUNITY

## 2023-03-16 RX ORDER — CHOLECALCIFEROL (VITAMIN D3) 1250 MCG
50000 CAPSULE ORAL
COMMUNITY
Start: 2023-02-21

## 2023-03-16 RX ORDER — MOMETASONE FUROATE 50 UG/1
SPRAY, METERED NASAL
COMMUNITY
Start: 2021-07-07

## 2023-03-16 RX ADMIN — BETAMETHASONE SODIUM PHOSPHATE AND BETAMETHASONE ACETATE 12 MG: 3; 3 INJECTION, SUSPENSION INTRA-ARTICULAR; INTRALESIONAL; INTRAMUSCULAR; SOFT TISSUE at 09:03

## 2023-03-16 RX ADMIN — LIDOCAINE HYDROCHLORIDE 3 MG: 20 INJECTION, SOLUTION INFILTRATION; PERINEURAL at 09:03

## 2023-03-16 NOTE — PROCEDURES
Large Joint Aspiration/Injection: bilateral knee    Date/Time: 3/16/2023 9:00 AM  Performed by: Tomas Gordillo MD  Authorized by: Tomas Gordillo MD     Consent Done?:  Yes (Verbal)  Indications:  Pain  Site marked: the procedure site was marked    Prep: patient was prepped and draped in usual sterile fashion    Approach:  Anterolateral  Location:  Knee  Laterality:  Bilateral  Site:  Bilateral knee  Medications (Right):  12 mg betamethasone acetate-betamethasone sodium phosphate 6 mg/mL; 3 mg LIDOcaine HCL 20 mg/ml (2%) 20 mg/mL (2 %)  Medications (Left):  12 mg betamethasone acetate-betamethasone sodium phosphate 6 mg/mL; 3 mg LIDOcaine HCL 20 mg/ml (2%) 20 mg/mL (2 %)  Patient tolerance:  Patient tolerated the procedure well with no immediate complications

## 2023-03-16 NOTE — PROGRESS NOTES
Subjective:    CC: Injections of the Right Knee, Injections of the Left Knee, and Injections (pt had eugenia cortisone injections 12/15/22 pt states injections helped for about a month,pt requesting injections today. not taking pain meds.)       HPI:  Patient returns to clinic for repeat evaluation of bilateral knees.  Status post bilateral steroid injections were obtained 22.  Patient states that she had good relief for about a month.  She would like to repeat these today.  Not currently taking any pain medication.  She continues to have pain with long standing walking and bending.    ROS: Refer to HPI for pertinent ROS. All other 12 point systems negative.    Objective:  There were no vitals filed for this visit.     Physical Exam:The patient is well-nourished, well-developed, in no apparent distress, pleasant and cooperative. Examination of the bilateral lower extremities,  compartments are soft and warm. Skin is intact. There are no signs or symptoms of DVT or infection. There is no obvious joint effusion. There is no erythema. Tenderness to palpation along the mediolateral joint line, right knee range of motion is 0-95; left knee range of motion is 0-100. The knee is stable to stressing with varus and valgus. Negative anterior and posterior drawer.   Negative Lachman´s.  Negative Kenna's test. Patella grind is positive, negative for apprehension.  Patient is neurovascularly intact distally.        Images:  Reviewed. Images Reviewed and discussed with patient.        Assessment:  1. Bilateral primary osteoarthritis of knee  - Large Joint Aspiration/Injection: bilateral knee  - LIDOcaine HCL 20 mg/ml (2%) injection 3 mg  - LIDOcaine HCL 20 mg/ml (2%) injection 3 mg  - betamethasone acetate-betamethasone sodium phosphate injection 12 mg  - betamethasone acetate-betamethasone sodium phosphate injection 12 mg        Plan: At this time we discussed her physical exam and previous imaging findings.  She tolerated the  injection very well to the left and right knee, we have discussed low-impact activities, we have discussed her elevated BMI.  I would like see back in 3 months to see how she is progressing.    Follow UP: Follow up in about 3 months (around 6/16/2023).    Large Joint Aspiration/Injection: bilateral knee    Date/Time: 3/16/2023 9:00 AM  Performed by: Tomas Gordillo MD  Authorized by: Tomas Gordillo MD     Consent Done?:  Yes (Verbal)  Indications:  Pain  Site marked: the procedure site was marked    Prep: patient was prepped and draped in usual sterile fashion    Approach:  Anterolateral  Location:  Knee  Laterality:  Bilateral  Site:  Bilateral knee  Medications (Right):  12 mg betamethasone acetate-betamethasone sodium phosphate 6 mg/mL; 3 mg LIDOcaine HCL 20 mg/ml (2%) 20 mg/mL (2 %)  Medications (Left):  12 mg betamethasone acetate-betamethasone sodium phosphate 6 mg/mL; 3 mg LIDOcaine HCL 20 mg/ml (2%) 20 mg/mL (2 %)  Patient tolerance:  Patient tolerated the procedure well with no immediate complications

## 2023-03-17 DIAGNOSIS — M17.0 BILATERAL PRIMARY OSTEOARTHRITIS OF KNEE: Primary | ICD-10-CM

## 2023-03-17 RX ORDER — MELOXICAM 15 MG/1
15 TABLET ORAL DAILY
Qty: 30 TABLET | Refills: 0 | Status: SHIPPED | OUTPATIENT
Start: 2023-03-17 | End: 2023-03-17

## 2023-03-17 RX ORDER — MELOXICAM 15 MG/1
15 TABLET ORAL DAILY PRN
Qty: 30 TABLET | Refills: 0 | Status: SHIPPED | OUTPATIENT
Start: 2023-03-17 | End: 2023-03-17

## 2023-03-17 RX ORDER — MELOXICAM 15 MG/1
15 TABLET ORAL DAILY
Qty: 30 TABLET | Refills: 0 | Status: ON HOLD | OUTPATIENT
Start: 2023-03-17 | End: 2023-12-01

## 2023-10-05 ENCOUNTER — TELEPHONE (OUTPATIENT)
Dept: ORTHOPEDICS | Facility: CLINIC | Age: 58
End: 2023-10-05

## 2023-10-05 ENCOUNTER — OFFICE VISIT (OUTPATIENT)
Dept: ORTHOPEDICS | Facility: CLINIC | Age: 58
End: 2023-10-05
Payer: COMMERCIAL

## 2023-10-05 VITALS
HEART RATE: 86 BPM | SYSTOLIC BLOOD PRESSURE: 160 MMHG | DIASTOLIC BLOOD PRESSURE: 118 MMHG | BODY MASS INDEX: 42.36 KG/M2 | WEIGHT: 230.19 LBS | HEIGHT: 62 IN

## 2023-10-05 DIAGNOSIS — M17.11 OSTEOARTHRITIS OF RIGHT KNEE, UNSPECIFIED OSTEOARTHRITIS TYPE: Primary | ICD-10-CM

## 2023-10-05 DIAGNOSIS — M17.0 BILATERAL PRIMARY OSTEOARTHRITIS OF KNEE: ICD-10-CM

## 2023-10-05 DIAGNOSIS — Z01.818 PRE-OP TESTING: ICD-10-CM

## 2023-10-05 PROCEDURE — 99214 OFFICE O/P EST MOD 30 MIN: CPT | Mod: ,,, | Performed by: ORTHOPAEDIC SURGERY

## 2023-10-05 PROCEDURE — 4010F PR ACE/ARB THEARPY RXD/TAKEN: ICD-10-PCS | Mod: CPTII,,, | Performed by: ORTHOPAEDIC SURGERY

## 2023-10-05 PROCEDURE — 1160F RVW MEDS BY RX/DR IN RCRD: CPT | Mod: CPTII,,, | Performed by: ORTHOPAEDIC SURGERY

## 2023-10-05 PROCEDURE — 3008F BODY MASS INDEX DOCD: CPT | Mod: CPTII,,, | Performed by: ORTHOPAEDIC SURGERY

## 2023-10-05 PROCEDURE — 4010F ACE/ARB THERAPY RXD/TAKEN: CPT | Mod: CPTII,,, | Performed by: ORTHOPAEDIC SURGERY

## 2023-10-05 PROCEDURE — 3008F PR BODY MASS INDEX (BMI) DOCUMENTED: ICD-10-PCS | Mod: CPTII,,, | Performed by: ORTHOPAEDIC SURGERY

## 2023-10-05 PROCEDURE — 1160F PR REVIEW ALL MEDS BY PRESCRIBER/CLIN PHARMACIST DOCUMENTED: ICD-10-PCS | Mod: CPTII,,, | Performed by: ORTHOPAEDIC SURGERY

## 2023-10-05 PROCEDURE — 1159F PR MEDICATION LIST DOCUMENTED IN MEDICAL RECORD: ICD-10-PCS | Mod: CPTII,,, | Performed by: ORTHOPAEDIC SURGERY

## 2023-10-05 PROCEDURE — 99214 PR OFFICE/OUTPT VISIT, EST, LEVL IV, 30-39 MIN: ICD-10-PCS | Mod: ,,, | Performed by: ORTHOPAEDIC SURGERY

## 2023-10-05 PROCEDURE — 3077F SYST BP >= 140 MM HG: CPT | Mod: CPTII,,, | Performed by: ORTHOPAEDIC SURGERY

## 2023-10-05 PROCEDURE — 3080F PR MOST RECENT DIASTOLIC BLOOD PRESSURE >= 90 MM HG: ICD-10-PCS | Mod: CPTII,,, | Performed by: ORTHOPAEDIC SURGERY

## 2023-10-05 PROCEDURE — 1159F MED LIST DOCD IN RCRD: CPT | Mod: CPTII,,, | Performed by: ORTHOPAEDIC SURGERY

## 2023-10-05 PROCEDURE — 3077F PR MOST RECENT SYSTOLIC BLOOD PRESSURE >= 140 MM HG: ICD-10-PCS | Mod: CPTII,,, | Performed by: ORTHOPAEDIC SURGERY

## 2023-10-05 PROCEDURE — 3080F DIAST BP >= 90 MM HG: CPT | Mod: CPTII,,, | Performed by: ORTHOPAEDIC SURGERY

## 2023-10-05 RX ORDER — HYDRALAZINE HYDROCHLORIDE 25 MG/1
25 TABLET, FILM COATED ORAL 2 TIMES DAILY
COMMUNITY
Start: 2023-08-06

## 2023-10-05 RX ORDER — LEVOFLOXACIN 750 MG/1
750 TABLET ORAL
Status: ON HOLD | COMMUNITY
Start: 2023-09-13 | End: 2023-12-01

## 2023-10-05 RX ORDER — SEMAGLUTIDE 0.25 MG/.5ML
0.5 INJECTION, SOLUTION SUBCUTANEOUS
COMMUNITY
Start: 2023-05-10

## 2023-10-05 NOTE — TELEPHONE ENCOUNTER
Due to patients elevated blood pressure in office today. It was recommended to her to go to the ER or call her primary care doctor. Patient refused to go to ER.

## 2023-10-05 NOTE — PROGRESS NOTES
"Subjective:    CC: Knee Pain (YELENA knee pain, states the right one is worse wants to discuss SX for her right knee no new injury, did have prior meni. Repair on the right side over 10 years ago)       HPI:  Patient returns today for repeat exam.  Patient continues to have bilateral knee pain.  She is considering surgical intervention.  We have discussed her elevated BMI.  She has failed multiple conservative treatments including injections, OTC pain medications, ice, physician guided home exercises, and bracing. She continues to have pain and loss of daily activities including the ability to participate in longstanding or walking.    ROS: Refer to HPI for pertinent ROS. All other 12 point systems negative.    Objective:  Vitals:    10/05/23 0802 10/05/23 0819   BP: (!) 216/138 (!) 160/118   Pulse: 96 86   Weight: 104.4 kg (230 lb 3.2 oz)    Height: 5' 2" (1.575 m)         Physical Exam:  The patient is well-nourished, well-developed, in no apparent distress, pleasant and cooperative. Examination of the bilateral lower extremities,  compartments are soft and warm. Skin is intact. There are no signs or symptoms of DVT or infection. There is no obvious joint effusion. There is no erythema. Tenderness to palpation along the mediolateral joint line, right knee range of motion is 0-100; left knee range of motion is 0-100. The knee is stable to stressing with varus and valgus. Negative anterior and posterior drawer.   Negative Lachman´s.  Negative Kenna's test. Patella grind is positive, negative for apprehension.  Patient is neurovascularly intact distally.      Images:  Previous x-rays of the right knee demonstrate severe tricompartmental osteoarthritis.  Bone-on-bone of the medial compartment.  Kgl grade 4.  Images Reviewed and discussed with patient.    Assessment:  1. Bilateral primary osteoarthritis of knee  - CT Knee w/o Contrast Right w/Wagner Protocol; Future  - Case Request Operating Room: ROBOTIC ARTHROPLASTY, " KNEE, TOTAL        Plan:  At this time we discussed her physical exam and previous imaging findings.  We have discussed her bone-on-bone arthritis, varus deformity.  We have discussed a right total knee arthroplasty.  She will continue to lose weight over the next 6 weeks.  I would like to recheck her in 4 weeks to see how she is doing, repeat BMI, in the meantime we will plan for right total knee arthroplasty with Wagner.  The risks benefits and alternatives were discussed with the patient in detail.  All questions were answered.  We will set this up at her convenience.    Follow UP: No follow-ups on file.

## 2023-10-05 NOTE — LETTER
Date 10/10/2023    Re:   Collette Cormier    :   1965    Dr. Childers ,           The above named patient is scheduled to have a right total knee replacment     On 2023.       *Type of Anesthesia: spinal    This patient needs surgical clearance from your office for this procedure.  Please perform necessary tests in order for this patient to be medically cleared for surgery. Please send or fax the clearance letter with most recent ECHO, EKG or stress test, to our office as soon as possible.     Our fax number is [phone].          We appreciate your help in getting our patient cleared for surgery.  Please feel free to call our office should you have any questions, [phone].             Thank You,  Dr. Tomas Gordillo MD

## 2023-10-09 RX ORDER — GABAPENTIN 100 MG/1
300 CAPSULE ORAL
Status: CANCELLED | OUTPATIENT
Start: 2023-10-09

## 2023-10-09 RX ORDER — SCOLOPAMINE TRANSDERMAL SYSTEM 1 MG/1
1 PATCH, EXTENDED RELEASE TRANSDERMAL ONCE AS NEEDED
Status: CANCELLED | OUTPATIENT
Start: 2023-10-09 | End: 2035-03-07

## 2023-10-09 RX ORDER — ONDANSETRON 4 MG/1
4 TABLET, ORALLY DISINTEGRATING ORAL
Status: CANCELLED | OUTPATIENT
Start: 2023-10-09

## 2023-10-09 RX ORDER — TRANEXAMIC ACID 650 MG/1
1950 TABLET ORAL
Status: CANCELLED | OUTPATIENT
Start: 2023-10-09 | End: 2023-10-09

## 2023-10-09 RX ORDER — ACETAMINOPHEN 500 MG
1000 TABLET ORAL
Status: CANCELLED | OUTPATIENT
Start: 2023-10-09

## 2023-10-09 RX ORDER — SODIUM CHLORIDE, SODIUM GLUCONATE, SODIUM ACETATE, POTASSIUM CHLORIDE AND MAGNESIUM CHLORIDE 30; 37; 368; 526; 502 MG/100ML; MG/100ML; MG/100ML; MG/100ML; MG/100ML
INJECTION, SOLUTION INTRAVENOUS CONTINUOUS
Status: CANCELLED | OUTPATIENT
Start: 2023-10-09

## 2023-10-09 NOTE — H&P
Admission History & Physical    Subjective:    CC: Knee Pain (YELENA knee pain, states the right one is worse wants to discuss SX for her right knee no new injury, did have prior meni. Repair on the right side over 10 years ago)       HPI:  Collette G Cormier presents today for preoperative evaluation for right total knee arthroplasty with Wagner robotic assistance. I reviewed the indications for surgery. The risks and benefits of the proposed and alternative treatments were discussed with the patient. Questions pertinent to the procedure were solicited and answered. Dr. Gordillo was available to answer any questions with instruction to call clinic with any further questions.No assurances were given. Informed consent was obtained. The patient expressed good understanding and wished to proceed with scheduling the procedure.     This patient has  Increased pain with activity Initiation  Interference with normal activities of daily living due to pain  Increased pain with weight bearing activities  Pain with range of motion    This patient has an active or unstable comorbidity that significantly increases the mortality risk and require more than 24 hours of postoperative monitoring.    This comorbidity is: BMI greater than or equal to 40 kg/m2 and Other:  hx of cancer requiring hormone suppression therapy        ROS:   Constitutional: No fever, weakness, or fatigue.   Ear/Nose/Mouth/Throat: No nasal congestion or sore throat.   Respiratory: No shortness of breath or cough.   Cardiovascular: No chest pain, palpitations, or peripheral edema.   Gastrointestinal: No nausea, vomiting, or abdominal pain.   Genitourinary: No dysuria.  Musculoskeletal: right knee pain, swelling, loss of motion.    Past Surgical History:   Procedure Laterality Date    BREAST LUMPECTOMY Right     HYSTERECTOMY      partial    REPAIR OF MENISCUS OF KNEE Right         Past Medical History:   Diagnosis Date    Cancer     Breast cancer    Hypertension          Objective:    Vitals:    10/05/23 0819   BP: (!) 160/118   Pulse: 86        Physical Exam:    Appearance: No distress, good color on room air. Alert and cooperative.  HEENT: Normocephalic. PERRLA EOM intact.   Lungs: Breathing unlabored.  Heart: Regular rate and rhythm.  Abdomen: Soft, non-tender.  No rebound tenderness.  Extremities: Examination of the bilateral lower extremities,  compartments are soft and warm. Skin is intact. There are no signs or symptoms of DVT or infection. There is no obvious joint effusion. There is no erythema. Tenderness to palpation along the mediolateral joint line, right knee range of motion is 0-100; left knee range of motion is 0-100. The knee is stable to stressing with varus and valgus. Negative anterior and posterior drawer.   Negative Lachman´s.  Negative Kenna's test. Patella grind is positive, negative for apprehension.  Patient is neurovascularly intact distally.  Skin: No rashes or open wounds.        Assessment:  1. Bilateral primary osteoarthritis of knee  - CT Knee w/o Contrast Right w/Wagner Protocol; Future  - Case Request Operating Room: ROBOTIC ARTHROPLASTY, KNEE, TOTAL    2. Osteoarthritis of right knee, unspecified osteoarthritis type  - Vital signs; Standing  - Insert peripheral IV; Standing  - Clip and Prep Other (please specifiy) (Operative site); Standing  - Cleanse with Chlorhexidine (CHG); Standing  - Diet NPO; Standing  - electrolyte-A infusion  - IP VTE LOW RISK PATIENT; Standing  - ceFAZolin (ANCEF) 2 g in dextrose 5 % (D5W) 50 mL IVPB  - acetaminophen tablet 1,000 mg  - gabapentin capsule 300 mg  - ondansetron disintegrating tablet 4 mg  - scopolamine 1.3-1.5 mg (1 mg over 3 days) 1 patch  - tranexamic acid (LYSTEDA) tablet 1,950 mg  - POCT glucose; Standing  - CBC auto differential; Future  - Comprehensive metabolic panel; Future  - Urinalysis; Future  - X-Ray Chest PA And Lateral; Future  - EKG 12-lead; Future  - Inpatient consult to Anesthesiology;  Standing  - Admit to Inpatient; Standing  - Place FRIDA hose; Standing  - Place sequential compression device; Standing  - Chlorohexidine Gluconate Bath; Standing  - MRSA PCR; Future  - Hemoglobin A1C; Future  - MRSA PCR    3. Pre-op testing  - Vital signs; Standing  - Insert peripheral IV; Standing  - Clip and Prep Other (please specifiy) (Operative site); Standing  - Cleanse with Chlorhexidine (CHG); Standing  - Diet NPO; Standing  - electrolyte-A infusion  - IP VTE LOW RISK PATIENT; Standing  - ceFAZolin (ANCEF) 2 g in dextrose 5 % (D5W) 50 mL IVPB  - acetaminophen tablet 1,000 mg  - gabapentin capsule 300 mg  - ondansetron disintegrating tablet 4 mg  - scopolamine 1.3-1.5 mg (1 mg over 3 days) 1 patch  - tranexamic acid (LYSTEDA) tablet 1,950 mg  - POCT glucose; Standing  - CBC auto differential; Future  - Comprehensive metabolic panel; Future  - Urinalysis; Future  - X-Ray Chest PA And Lateral; Future  - EKG 12-lead; Future  - Inpatient consult to Anesthesiology; Standing  - Admit to Inpatient; Standing  - Place FRIDA hose; Standing  - Place sequential compression device; Standing  - Chlorohexidine Gluconate Bath; Standing  - MRSA PCR; Future  - Hemoglobin A1C; Future  - MRSA PCR       Plan:  Plan for right total knee arthroplasty with Wagner robotic assistance at [facility]. The patient has been given preoperative instructions. Post-operative appointment is scheduled for 2 weeks.  Patient was given knee exercises for preop rehab.

## 2023-10-16 ENCOUNTER — HOSPITAL ENCOUNTER (OUTPATIENT)
Dept: RADIOLOGY | Facility: HOSPITAL | Age: 58
Discharge: HOME | End: 2023-10-16
Payer: COMMERCIAL

## 2023-10-16 DIAGNOSIS — Z01.818 PRE-OP TESTING: ICD-10-CM

## 2023-10-16 DIAGNOSIS — M17.11 OSTEOARTHRITIS OF RIGHT KNEE, UNSPECIFIED OSTEOARTHRITIS TYPE: ICD-10-CM

## 2023-10-16 LAB — MRSA PCR SCRN (OHS): NOT DETECTED

## 2023-10-16 PROCEDURE — 71046 X-RAY EXAM CHEST 2 VIEWS: CPT | Mod: TC

## 2023-10-17 ENCOUNTER — TELEPHONE (OUTPATIENT)
Dept: ORTHOPEDICS | Facility: CLINIC | Age: 58
End: 2023-10-17
Payer: COMMERCIAL

## 2023-10-17 NOTE — LETTER
Date 10/18/2023    Re:   Angelika Rosa    :   1965    Dr. Ennis ,           The above named patient is scheduled to have a right total knee replacement     On 2023.       *Type of Anesthesia: spinal    This patient needs surgical clearance from your office for this procedure.  Please perform necessary tests in order for this patient to be medically cleared for surgery. Please send or fax the clearance letter with most recent ECHO, EKG or stress test, to our office as soon as possible.     Our fax number is [phone].          We appreciate your help in getting our patient cleared for surgery.  Please feel free to call our office should you have any questions, [phone].             Thank You,  Dr. Duy MD

## 2023-10-18 NOTE — TELEPHONE ENCOUNTER
Patient returned call with oncologist Bong Ennis with [facility].     Clearance faxed to doctor.

## 2023-11-01 RX ORDER — IBUPROFEN 200 MG
200 TABLET ORAL EVERY 6 HOURS PRN
Status: ON HOLD | COMMUNITY
End: 2023-12-01

## 2023-11-01 RX ORDER — ZINC GLUCONATE 13.3 MG
200 LOZENGE ORAL DAILY
Status: ON HOLD | COMMUNITY
End: 2023-12-01

## 2023-11-02 ENCOUNTER — OFFICE VISIT (OUTPATIENT)
Dept: ORTHOPEDICS | Facility: CLINIC | Age: 58
End: 2023-11-02
Payer: COMMERCIAL

## 2023-11-02 ENCOUNTER — HOSPITAL ENCOUNTER (OUTPATIENT)
Dept: RADIOLOGY | Facility: HOSPITAL | Age: 58
Discharge: HOME | End: 2023-11-02
Attending: ORTHOPAEDIC SURGERY
Payer: COMMERCIAL

## 2023-11-02 VITALS
WEIGHT: 228.81 LBS | SYSTOLIC BLOOD PRESSURE: 176 MMHG | HEIGHT: 62 IN | HEART RATE: 83 BPM | BODY MASS INDEX: 42.11 KG/M2 | DIASTOLIC BLOOD PRESSURE: 117 MMHG

## 2023-11-02 DIAGNOSIS — M17.11 OSTEOARTHRITIS OF RIGHT KNEE, UNSPECIFIED OSTEOARTHRITIS TYPE: Primary | ICD-10-CM

## 2023-11-02 DIAGNOSIS — M17.0 BILATERAL PRIMARY OSTEOARTHRITIS OF KNEE: ICD-10-CM

## 2023-11-02 PROCEDURE — 3008F BODY MASS INDEX DOCD: CPT | Mod: CPTII,,, | Performed by: ORTHOPAEDIC SURGERY

## 2023-11-02 PROCEDURE — 4010F ACE/ARB THERAPY RXD/TAKEN: CPT | Mod: CPTII,,, | Performed by: ORTHOPAEDIC SURGERY

## 2023-11-02 PROCEDURE — 4010F PR ACE/ARB THEARPY RXD/TAKEN: ICD-10-PCS | Mod: CPTII,,, | Performed by: ORTHOPAEDIC SURGERY

## 2023-11-02 PROCEDURE — 3077F PR MOST RECENT SYSTOLIC BLOOD PRESSURE >= 140 MM HG: ICD-10-PCS | Mod: CPTII,,, | Performed by: ORTHOPAEDIC SURGERY

## 2023-11-02 PROCEDURE — 3044F HG A1C LEVEL LT 7.0%: CPT | Mod: CPTII,,, | Performed by: ORTHOPAEDIC SURGERY

## 2023-11-02 PROCEDURE — 73700 CT LOWER EXTREMITY W/O DYE: CPT | Mod: TC,RT

## 2023-11-02 PROCEDURE — 3008F PR BODY MASS INDEX (BMI) DOCUMENTED: ICD-10-PCS | Mod: CPTII,,, | Performed by: ORTHOPAEDIC SURGERY

## 2023-11-02 PROCEDURE — 3077F SYST BP >= 140 MM HG: CPT | Mod: CPTII,,, | Performed by: ORTHOPAEDIC SURGERY

## 2023-11-02 PROCEDURE — 99213 PR OFFICE/OUTPT VISIT, EST, LEVL III, 20-29 MIN: ICD-10-PCS | Mod: ,,, | Performed by: ORTHOPAEDIC SURGERY

## 2023-11-02 PROCEDURE — 3080F PR MOST RECENT DIASTOLIC BLOOD PRESSURE >= 90 MM HG: ICD-10-PCS | Mod: CPTII,,, | Performed by: ORTHOPAEDIC SURGERY

## 2023-11-02 PROCEDURE — 1159F MED LIST DOCD IN RCRD: CPT | Mod: CPTII,,, | Performed by: ORTHOPAEDIC SURGERY

## 2023-11-02 PROCEDURE — 3044F PR MOST RECENT HEMOGLOBIN A1C LEVEL <7.0%: ICD-10-PCS | Mod: CPTII,,, | Performed by: ORTHOPAEDIC SURGERY

## 2023-11-02 PROCEDURE — 1159F PR MEDICATION LIST DOCUMENTED IN MEDICAL RECORD: ICD-10-PCS | Mod: CPTII,,, | Performed by: ORTHOPAEDIC SURGERY

## 2023-11-02 PROCEDURE — 99213 OFFICE O/P EST LOW 20 MIN: CPT | Mod: ,,, | Performed by: ORTHOPAEDIC SURGERY

## 2023-11-02 PROCEDURE — 3080F DIAST BP >= 90 MM HG: CPT | Mod: CPTII,,, | Performed by: ORTHOPAEDIC SURGERY

## 2023-11-02 NOTE — PROGRESS NOTES
"Subjective:    CC: Pre-op Exam (Right TKA sx 11/14/23, wants to discuss other knee as well for SX )       HPI:  Patient returns today for repeat exam.  Patient continues to have pain and loss of motion of her right hip.  We have discussed her elevated blood pressure.  We will follow up with her PCP.  She is here for recheck of her BMI as well.    ROS: Refer to HPI for pertinent ROS. All other 12 point systems negative.    Objective:  Vitals:    11/02/23 1257   BP: (!) 176/117   BP Location: Left forearm   Patient Position: Sitting   Pulse: 83   Weight: 103.8 kg (228 lb 12.8 oz)   Height: 5' 2" (1.575 m)        Physical Exam:  The patient is well-nourished, well-developed and in no apparent distress, pleasant and cooperative. Examination of the right lower extremity compartments are soft and warm. Skin is intact. There are no signs or symptoms of DVT or infection. There is no obvious joint effusion. There is no erythema. Tender to palpation along the medial joint line, varus deformity, patellofemoral joint , right knee range of motion is 0-100. The knee is stable to exam with varus and valgus stressing. Negative anterior and posterior drawer. Negative Lachman´s.  Negative Kenna's test. Patella grind is positive, Negative for apprehension. Neurovascularly intact distally.    Images: . Images Reviewed and discussed with patient.    Assessment:  1. Osteoarthritis of right knee, unspecified osteoarthritis type  - CT Knee w/o Contrast Right w/Wagner Protocol; Future        Plan:  At this time we discussed her physical exam and previous imaging findings.  We have discussed her elevated BMI.  She will continue low-impact activities.  We have discussed her high blood pressure as well.  Risks benefits and alternatives were discussed with the patient in detail.  All questions were answered.  No guarantees were given.  We will for a total knee Arthroplasty, we have discussed possible cancellation if needed  Follow UP: No " follow-ups on file.

## 2023-11-07 ENCOUNTER — TELEPHONE (OUTPATIENT)
Dept: ORTHOPEDICS | Facility: CLINIC | Age: 58
End: 2023-11-07
Payer: COMMERCIAL

## 2023-11-07 NOTE — TELEPHONE ENCOUNTER
Called dr. Bong aldrich's office at [facility].     Spoke to Scott solorzano to radha cotton .

## 2023-11-08 NOTE — TELEPHONE ENCOUNTER
Call was returned. Vm left stating that I needed to call the [facility] office [phone].    Called office. No answer.

## 2023-11-14 ENCOUNTER — HOSPITAL ENCOUNTER (OUTPATIENT)
Facility: HOSPITAL | Age: 58
Discharge: HOME | End: 2023-11-14
Attending: ORTHOPAEDIC SURGERY | Admitting: ORTHOPAEDIC SURGERY
Payer: COMMERCIAL

## 2023-11-14 VITALS
WEIGHT: 223.13 LBS | RESPIRATION RATE: 20 BRPM | HEART RATE: 84 BPM | BODY MASS INDEX: 41.06 KG/M2 | OXYGEN SATURATION: 99 % | SYSTOLIC BLOOD PRESSURE: 196 MMHG | HEIGHT: 62 IN | DIASTOLIC BLOOD PRESSURE: 138 MMHG | TEMPERATURE: 97 F

## 2023-11-14 DIAGNOSIS — M17.11 OSTEOARTHRITIS OF RIGHT KNEE: ICD-10-CM

## 2023-11-14 DIAGNOSIS — Z01.818 PRE-OP TESTING: ICD-10-CM

## 2023-11-14 DIAGNOSIS — M17.11 OSTEOARTHRITIS OF RIGHT KNEE, UNSPECIFIED OSTEOARTHRITIS TYPE: ICD-10-CM

## 2023-11-14 LAB — POCT GLUCOSE: 97 MG/DL (ref 70–110)

## 2023-11-14 PROCEDURE — 99499 UNLISTED E&M SERVICE: CPT | Mod: ,,, | Performed by: ORTHOPAEDIC SURGERY

## 2023-11-14 PROCEDURE — 25000003 PHARM REV CODE 250

## 2023-11-14 PROCEDURE — 82962 GLUCOSE BLOOD TEST: CPT | Performed by: ORTHOPAEDIC SURGERY

## 2023-11-14 PROCEDURE — 99499 NO LOS: ICD-10-PCS | Mod: ,,, | Performed by: ORTHOPAEDIC SURGERY

## 2023-11-14 RX ORDER — HYDROMORPHONE HYDROCHLORIDE 2 MG/ML
0.4 INJECTION, SOLUTION INTRAMUSCULAR; INTRAVENOUS; SUBCUTANEOUS EVERY 5 MIN PRN
Status: CANCELLED | OUTPATIENT
Start: 2023-11-14

## 2023-11-14 RX ORDER — ACETAMINOPHEN 500 MG
1000 TABLET ORAL
Status: COMPLETED | OUTPATIENT
Start: 2023-11-14 | End: 2023-11-14

## 2023-11-14 RX ORDER — SODIUM CHLORIDE, SODIUM GLUCONATE, SODIUM ACETATE, POTASSIUM CHLORIDE AND MAGNESIUM CHLORIDE 30; 37; 368; 526; 502 MG/100ML; MG/100ML; MG/100ML; MG/100ML; MG/100ML
INJECTION, SOLUTION INTRAVENOUS CONTINUOUS
Status: CANCELLED | OUTPATIENT
Start: 2023-11-14 | End: 2023-12-14

## 2023-11-14 RX ORDER — MEPERIDINE HYDROCHLORIDE 25 MG/ML
6.25 INJECTION INTRAMUSCULAR; INTRAVENOUS; SUBCUTANEOUS ONCE AS NEEDED
Status: CANCELLED | OUTPATIENT
Start: 2023-11-14 | End: 2023-11-15

## 2023-11-14 RX ORDER — SODIUM CHLORIDE, SODIUM GLUCONATE, SODIUM ACETATE, POTASSIUM CHLORIDE AND MAGNESIUM CHLORIDE 30; 37; 368; 526; 502 MG/100ML; MG/100ML; MG/100ML; MG/100ML; MG/100ML
INJECTION, SOLUTION INTRAVENOUS CONTINUOUS
Status: DISCONTINUED | OUTPATIENT
Start: 2023-11-14 | End: 2023-11-14 | Stop reason: HOSPADM

## 2023-11-14 RX ORDER — GABAPENTIN 300 MG/1
300 CAPSULE ORAL
Status: COMPLETED | OUTPATIENT
Start: 2023-11-14 | End: 2023-11-14

## 2023-11-14 RX ORDER — BUPIVACAINE HYDROCHLORIDE 2.5 MG/ML
30 INJECTION, SOLUTION EPIDURAL; INFILTRATION; INTRACAUDAL ONCE
Status: DISCONTINUED | OUTPATIENT
Start: 2023-11-14 | End: 2023-11-14 | Stop reason: HOSPADM

## 2023-11-14 RX ORDER — ONDANSETRON 4 MG/1
4 TABLET, ORALLY DISINTEGRATING ORAL
Status: COMPLETED | OUTPATIENT
Start: 2023-11-14 | End: 2023-11-14

## 2023-11-14 RX ORDER — SODIUM CHLORIDE, SODIUM LACTATE, POTASSIUM CHLORIDE, CALCIUM CHLORIDE 600; 310; 30; 20 MG/100ML; MG/100ML; MG/100ML; MG/100ML
INJECTION, SOLUTION INTRAVENOUS CONTINUOUS
Status: CANCELLED | OUTPATIENT
Start: 2023-11-14

## 2023-11-14 RX ORDER — SCOLOPAMINE TRANSDERMAL SYSTEM 1 MG/1
1 PATCH, EXTENDED RELEASE TRANSDERMAL ONCE AS NEEDED
Status: DISCONTINUED | OUTPATIENT
Start: 2023-11-14 | End: 2023-11-14 | Stop reason: HOSPADM

## 2023-11-14 RX ORDER — HYDROCODONE BITARTRATE AND ACETAMINOPHEN 5; 325 MG/1; MG/1
1 TABLET ORAL
Status: CANCELLED | OUTPATIENT
Start: 2023-11-14

## 2023-11-14 RX ORDER — TRANEXAMIC ACID 650 MG/1
1950 TABLET ORAL
Status: COMPLETED | OUTPATIENT
Start: 2023-11-14 | End: 2023-11-14

## 2023-11-14 RX ORDER — PROCHLORPERAZINE EDISYLATE 5 MG/ML
5 INJECTION INTRAMUSCULAR; INTRAVENOUS EVERY 30 MIN PRN
Status: CANCELLED | OUTPATIENT
Start: 2023-11-14

## 2023-11-14 RX ORDER — MIDAZOLAM HYDROCHLORIDE 1 MG/ML
.5-4 INJECTION INTRAMUSCULAR; INTRAVENOUS
Status: DISCONTINUED | OUTPATIENT
Start: 2023-11-14 | End: 2023-11-14 | Stop reason: HOSPADM

## 2023-11-14 RX ORDER — ONDANSETRON 2 MG/ML
4 INJECTION INTRAMUSCULAR; INTRAVENOUS DAILY PRN
Status: CANCELLED | OUTPATIENT
Start: 2023-11-14

## 2023-11-14 RX ORDER — DEXAMETHASONE SODIUM PHOSPHATE 4 MG/ML
4 INJECTION, SOLUTION INTRA-ARTICULAR; INTRALESIONAL; INTRAMUSCULAR; INTRAVENOUS; SOFT TISSUE ONCE
Status: DISCONTINUED | OUTPATIENT
Start: 2023-11-14 | End: 2023-11-14 | Stop reason: HOSPADM

## 2023-11-14 RX ADMIN — SODIUM CHLORIDE, SODIUM GLUCONATE, SODIUM ACETATE, POTASSIUM CHLORIDE AND MAGNESIUM CHLORIDE: 526; 502; 368; 37; 30 INJECTION, SOLUTION INTRAVENOUS at 09:11

## 2023-11-14 RX ADMIN — ACETAMINOPHEN 1000 MG: 500 TABLET ORAL at 09:11

## 2023-11-14 RX ADMIN — TRANEXAMIC ACID 1950 MG: 650 TABLET ORAL at 09:11

## 2023-11-14 RX ADMIN — ONDANSETRON 4 MG: 4 TABLET, ORALLY DISINTEGRATING ORAL at 09:11

## 2023-11-14 RX ADMIN — GABAPENTIN 300 MG: 300 CAPSULE ORAL at 09:11

## 2023-11-14 NOTE — OR NURSING
Patient states she  will follow up with Dr Sanders today re:hypertension as recommended by Dr Gordillo.  Patient denies chest pain, shortness of breath, or headache; asymptomatic. Excuse from work for 2 weeks given to patient as ordered by Dr Gordillo.

## 2023-11-14 NOTE — PROGRESS NOTES
Patient had a scheduled knee replacement for today.  Unfortunately her blood pressure remains to be too elevated.  We have discussed following up with her PCP for further BP control, and plan to reschedule for her elective total knee arthroplasty when her blood pressure is acceptable.  We have discussed this with the patient and patient's family who was at bedside.  They are also in agreement.

## 2023-11-14 NOTE — OR NURSING
Per Dr PERNELL Schultz and Dr Gordillo -surgery is cancelled today due to hypertension-bp 196/138, hr 84bpm-patient denies headache, chest pain or shortness of breath.  Dr Tang at patient bedside informing of cancellation of surgery today/plan of care

## 2023-11-27 ENCOUNTER — OFFICE VISIT (OUTPATIENT)
Dept: ORTHOPEDICS | Facility: CLINIC | Age: 58
End: 2023-11-27
Payer: COMMERCIAL

## 2023-11-27 ENCOUNTER — CLINICAL SUPPORT (OUTPATIENT)
Dept: LAB | Facility: HOSPITAL | Age: 58
End: 2023-11-27
Attending: ORTHOPAEDIC SURGERY
Payer: COMMERCIAL

## 2023-11-27 VITALS
WEIGHT: 222.19 LBS | BODY MASS INDEX: 40.89 KG/M2 | HEIGHT: 62 IN | DIASTOLIC BLOOD PRESSURE: 88 MMHG | SYSTOLIC BLOOD PRESSURE: 149 MMHG | HEART RATE: 93 BPM

## 2023-11-27 DIAGNOSIS — M17.11 PRIMARY OSTEOARTHRITIS OF RIGHT KNEE: Primary | ICD-10-CM

## 2023-11-27 DIAGNOSIS — Z01.818 PRE-OP TESTING: ICD-10-CM

## 2023-11-27 DIAGNOSIS — M17.11 PRIMARY OSTEOARTHRITIS OF RIGHT KNEE: ICD-10-CM

## 2023-11-27 PROCEDURE — 4010F PR ACE/ARB THEARPY RXD/TAKEN: ICD-10-PCS | Mod: CPTII,,,

## 2023-11-27 PROCEDURE — 3044F HG A1C LEVEL LT 7.0%: CPT | Mod: CPTII,,,

## 2023-11-27 PROCEDURE — 4010F ACE/ARB THERAPY RXD/TAKEN: CPT | Mod: CPTII,,,

## 2023-11-27 PROCEDURE — 3008F BODY MASS INDEX DOCD: CPT | Mod: CPTII,,,

## 2023-11-27 PROCEDURE — 1160F PR REVIEW ALL MEDS BY PRESCRIBER/CLIN PHARMACIST DOCUMENTED: ICD-10-PCS | Mod: CPTII,,,

## 2023-11-27 PROCEDURE — 3077F PR MOST RECENT SYSTOLIC BLOOD PRESSURE >= 140 MM HG: ICD-10-PCS | Mod: CPTII,,,

## 2023-11-27 PROCEDURE — 99214 OFFICE O/P EST MOD 30 MIN: CPT | Mod: ,,,

## 2023-11-27 PROCEDURE — 3079F PR MOST RECENT DIASTOLIC BLOOD PRESSURE 80-89 MM HG: ICD-10-PCS | Mod: CPTII,,,

## 2023-11-27 PROCEDURE — 3008F PR BODY MASS INDEX (BMI) DOCUMENTED: ICD-10-PCS | Mod: CPTII,,,

## 2023-11-27 PROCEDURE — 3079F DIAST BP 80-89 MM HG: CPT | Mod: CPTII,,,

## 2023-11-27 PROCEDURE — 93010 EKG 12-LEAD: ICD-10-PCS | Mod: ,,, | Performed by: STUDENT IN AN ORGANIZED HEALTH CARE EDUCATION/TRAINING PROGRAM

## 2023-11-27 PROCEDURE — 3077F SYST BP >= 140 MM HG: CPT | Mod: CPTII,,,

## 2023-11-27 PROCEDURE — 3044F PR MOST RECENT HEMOGLOBIN A1C LEVEL <7.0%: ICD-10-PCS | Mod: CPTII,,,

## 2023-11-27 PROCEDURE — 1159F MED LIST DOCD IN RCRD: CPT | Mod: CPTII,,,

## 2023-11-27 PROCEDURE — 93010 ELECTROCARDIOGRAM REPORT: CPT | Mod: ,,, | Performed by: STUDENT IN AN ORGANIZED HEALTH CARE EDUCATION/TRAINING PROGRAM

## 2023-11-27 PROCEDURE — 1159F PR MEDICATION LIST DOCUMENTED IN MEDICAL RECORD: ICD-10-PCS | Mod: CPTII,,,

## 2023-11-27 PROCEDURE — 93005 ELECTROCARDIOGRAM TRACING: CPT

## 2023-11-27 PROCEDURE — 99214 PR OFFICE/OUTPT VISIT, EST, LEVL IV, 30-39 MIN: ICD-10-PCS | Mod: ,,,

## 2023-11-27 PROCEDURE — 1160F RVW MEDS BY RX/DR IN RCRD: CPT | Mod: CPTII,,,

## 2023-11-27 RX ORDER — ALIROCUMAB 75 MG/ML
INJECTION, SOLUTION SUBCUTANEOUS
Status: ON HOLD | COMMUNITY
Start: 2023-07-10 | End: 2023-12-01

## 2023-11-27 RX ORDER — ISOSORBIDE MONONITRATE 20 MG/1
TABLET ORAL
Status: ON HOLD | COMMUNITY
End: 2023-12-01

## 2023-11-27 RX ORDER — ACETAMINOPHEN 500 MG
1000 TABLET ORAL
Status: CANCELLED | OUTPATIENT
Start: 2023-11-27

## 2023-11-27 RX ORDER — TRANEXAMIC ACID 650 MG/1
1950 TABLET ORAL
Status: CANCELLED | OUTPATIENT
Start: 2023-11-27 | End: 2023-11-27

## 2023-11-27 RX ORDER — SCOLOPAMINE TRANSDERMAL SYSTEM 1 MG/1
1 PATCH, EXTENDED RELEASE TRANSDERMAL ONCE AS NEEDED
Status: CANCELLED | OUTPATIENT
Start: 2023-11-27 | End: 2035-04-25

## 2023-11-27 RX ORDER — ONDANSETRON 4 MG/1
4 TABLET, ORALLY DISINTEGRATING ORAL
Status: CANCELLED | OUTPATIENT
Start: 2023-11-27

## 2023-11-27 RX ORDER — DILTIAZEM HYDROCHLORIDE 360 MG/1
360 CAPSULE, EXTENDED RELEASE ORAL EVERY MORNING
COMMUNITY
Start: 2023-11-14

## 2023-11-27 RX ORDER — GABAPENTIN 100 MG/1
300 CAPSULE ORAL
Status: CANCELLED | OUTPATIENT
Start: 2023-11-27

## 2023-11-27 RX ORDER — SODIUM CHLORIDE, SODIUM GLUCONATE, SODIUM ACETATE, POTASSIUM CHLORIDE AND MAGNESIUM CHLORIDE 30; 37; 368; 526; 502 MG/100ML; MG/100ML; MG/100ML; MG/100ML; MG/100ML
INJECTION, SOLUTION INTRAVENOUS CONTINUOUS
Status: CANCELLED | OUTPATIENT
Start: 2023-11-27

## 2023-11-27 NOTE — LETTER
Date 2023     Re:   Collette Cormier     :   1965     Dr. Childers ,             The above named patient is scheduled to have a right total knee replacment      On 2023.        *Type of Anesthesia: spinal     This patient needs surgical clearance from your office for this procedure.  Please perform necessary tests in order for this patient to be medically cleared for surgery. Please send or fax the clearance letter with most recent ECHO, EKG or stress test, to our office as soon as possible.      Our fax number is [phone].           We appreciate your help in getting our patient cleared for surgery.  Please feel free to call our office should you have any questions, [phone].               Thank You,  Dr. Tomas Gordillo MD

## 2023-11-27 NOTE — H&P (VIEW-ONLY)
Admission History & Physical    Subjective:    CC: Pain of the Right Knee (Possible pre op Rt TKA pain started years ago has gotten progressively worse states cold weather makes pain worse, not taking pain meds.pt wears a knee brace PRN.)       HPI:  Collette G Cormier presents today for repeat evaluation.  Patient was scheduled to have a right total knee arthroplasty but was canceled due to elevated blood pressure.  She states she has since followed up with Dr. Childers who has added additional blood pressure medication.  She states it is currently well managed and he has cleared her for surgery again.  Today she would like to proceed with preoperative evaluation for right total knee arthroplasty with [employer] robotic assistance. I reviewed the indications for surgery. The risks and benefits of the proposed and alternative treatments were discussed with the patient. Questions pertinent to the procedure were solicited and answered.  She was given instruction to call clinic with any further questions.No assurances were given. Informed consent was obtained. The patient expressed good understanding and wished to proceed with scheduling the procedure.     This patient has  Increased pain with activity Initiation  Interference with normal activities of daily living due to pain  Increased pain with weight bearing activities  Pain with range of motion          ROS:   Constitutional: No fever, weakness, or fatigue.   Ear/Nose/Mouth/Throat: No nasal congestion or sore throat.   Respiratory: No shortness of breath or cough.   Cardiovascular: No chest pain, palpitations, or peripheral edema.   Gastrointestinal: No nausea, vomiting, or abdominal pain.   Genitourinary: No dysuria.  Musculoskeletal:  Right knee pain, swelling, loss of motion.    Past Surgical History:   Procedure Laterality Date    BREAST LUMPECTOMY Right     x1 lymph node removed    COLONOSCOPY      HYSTERECTOMY      partial    REPAIR OF MENISCUS OF KNEE Right          Past Medical History:   Diagnosis Date    Arthritis     Cancer     Breast cancer-right    Digestive disorder     reflux    Hypertension         Objective:    Vitals:    11/27/23 0848   BP: (!) 149/88   Pulse: 93        Physical Exam:    Appearance: No distress, good color on room air. Alert and cooperative.  HEENT: Normocephalic. PERRLA EOM intact.   Lungs: Breathing unlabored.  Heart: Regular rate and rhythm.  Abdomen: Soft, non-tender.  No rebound tenderness.  Extremities:  The patient is well-nourished, well-developed and in no apparent distress, pleasant and cooperative. Examination of the right lower extremity compartments are soft and warm. Skin is intact. There are no signs or symptoms of DVT or infection. There is a mild joint effusion. There is no erythema. Tender to palpation along the anterior joint line ,right knee range of motion is 5-100 degrees. The knee is stable to exam with varus and valgus stressing. Negative anterior and posterior drawer. Negative Lachman´s.  Negative Kenna's test. Patella grind is positive, Negative for apprehension. Neurovascularly intact distally.  Skin: No rashes or open wounds.        Assessment:  1. Primary osteoarthritis of right knee  - Vital signs; Standing  - Insert peripheral IV; Standing  - Clip and Prep Other (please specifiy) (Operative site); Standing  - Cleanse with Chlorhexidine (CHG); Standing  - Diet NPO; Standing  - electrolyte-A infusion  - IP VTE LOW RISK PATIENT; Standing  - ceFAZolin (ANCEF) 2 g in dextrose 5 % (D5W) 50 mL IVPB  - acetaminophen tablet 1,000 mg  - gabapentin capsule 300 mg  - ondansetron disintegrating tablet 4 mg  - scopolamine 1.3-1.5 mg (1 mg over 3 days) 1 patch  - tranexamic acid (LYSTEDA) tablet 1,950 mg  - POCT glucose; Standing  - CBC auto differential; Future  - Comprehensive metabolic panel; Future  - Urinalysis; Future  - EKG 12-lead; Future  - Inpatient consult to Anesthesiology; Standing  - Place in Outpatient;  Standing  - Place FRIDA hose; Standing  - Place sequential compression device; Standing  - Chlorohexidine Gluconate Bath; Standing  - Case Request Operating Room: ROBOTIC ARTHROPLASTY, KNEE, TOTAL    2. Pre-op testing  - Vital signs; Standing  - Insert peripheral IV; Standing  - Clip and Prep Other (please specifiy) (Operative site); Standing  - Cleanse with Chlorhexidine (CHG); Standing  - Diet NPO; Standing  - electrolyte-A infusion  - IP VTE LOW RISK PATIENT; Standing  - ceFAZolin (ANCEF) 2 g in dextrose 5 % (D5W) 50 mL IVPB  - acetaminophen tablet 1,000 mg  - gabapentin capsule 300 mg  - ondansetron disintegrating tablet 4 mg  - scopolamine 1.3-1.5 mg (1 mg over 3 days) 1 patch  - tranexamic acid (LYSTEDA) tablet 1,950 mg  - POCT glucose; Standing  - CBC auto differential; Future  - Comprehensive metabolic panel; Future  - Urinalysis; Future  - EKG 12-lead; Future  - Inpatient consult to Anesthesiology; Standing  - Place in Outpatient; Standing  - Place FRIDA hose; Standing  - Place sequential compression device; Standing  - Chlorohexidine Gluconate Bath; Standing  - Case Request Operating Room: ROBOTIC ARTHROPLASTY, KNEE, TOTAL       Plan:  Plan for right total knee arthroplasty with Wagner robotic assistance at [facility] on 12/01/2023. The patient has been given preoperative instructions. Post-operative appointment is scheduled for 2 weeks.  We will obtain PCP clearance from Dr. Childers.  Patient has been holding wegovy.

## 2023-11-27 NOTE — H&P
Admission History & Physical    Subjective:    CC: Pain of the Right Knee (Possible pre op Rt TKA pain started years ago has gotten progressively worse states cold weather makes pain worse, not taking pain meds.pt wears a knee brace PRN.)       HPI:  Collette G Cormier presents today for repeat evaluation.  Patient was scheduled to have a right total knee arthroplasty but was canceled due to elevated blood pressure.  She states she has since followed up with Dr. Childers who has added additional blood pressure medication.  She states it is currently well managed and he has cleared her for surgery again.  Today she would like to proceed with preoperative evaluation for right total knee arthroplasty with [employer] robotic assistance. I reviewed the indications for surgery. The risks and benefits of the proposed and alternative treatments were discussed with the patient. Questions pertinent to the procedure were solicited and answered.  She was given instruction to call clinic with any further questions.No assurances were given. Informed consent was obtained. The patient expressed good understanding and wished to proceed with scheduling the procedure.     This patient has  Increased pain with activity Initiation  Interference with normal activities of daily living due to pain  Increased pain with weight bearing activities  Pain with range of motion          ROS:   Constitutional: No fever, weakness, or fatigue.   Ear/Nose/Mouth/Throat: No nasal congestion or sore throat.   Respiratory: No shortness of breath or cough.   Cardiovascular: No chest pain, palpitations, or peripheral edema.   Gastrointestinal: No nausea, vomiting, or abdominal pain.   Genitourinary: No dysuria.  Musculoskeletal:  Right knee pain, swelling, loss of motion.    Past Surgical History:   Procedure Laterality Date    BREAST LUMPECTOMY Right     x1 lymph node removed    COLONOSCOPY      HYSTERECTOMY      partial    REPAIR OF MENISCUS OF KNEE Right          Past Medical History:   Diagnosis Date    Arthritis     Cancer     Breast cancer-right    Digestive disorder     reflux    Hypertension         Objective:    Vitals:    11/27/23 0848   BP: (!) 149/88   Pulse: 93        Physical Exam:    Appearance: No distress, good color on room air. Alert and cooperative.  HEENT: Normocephalic. PERRLA EOM intact.   Lungs: Breathing unlabored.  Heart: Regular rate and rhythm.  Abdomen: Soft, non-tender.  No rebound tenderness.  Extremities:  The patient is well-nourished, well-developed and in no apparent distress, pleasant and cooperative. Examination of the right lower extremity compartments are soft and warm. Skin is intact. There are no signs or symptoms of DVT or infection. There is a mild joint effusion. There is no erythema. Tender to palpation along the anterior joint line ,right knee range of motion is 5-100 degrees. The knee is stable to exam with varus and valgus stressing. Negative anterior and posterior drawer. Negative Lachman´s.  Negative Kenna's test. Patella grind is positive, Negative for apprehension. Neurovascularly intact distally.  Skin: No rashes or open wounds.        Assessment:  1. Primary osteoarthritis of right knee  - Vital signs; Standing  - Insert peripheral IV; Standing  - Clip and Prep Other (please specifiy) (Operative site); Standing  - Cleanse with Chlorhexidine (CHG); Standing  - Diet NPO; Standing  - electrolyte-A infusion  - IP VTE LOW RISK PATIENT; Standing  - ceFAZolin (ANCEF) 2 g in dextrose 5 % (D5W) 50 mL IVPB  - acetaminophen tablet 1,000 mg  - gabapentin capsule 300 mg  - ondansetron disintegrating tablet 4 mg  - scopolamine 1.3-1.5 mg (1 mg over 3 days) 1 patch  - tranexamic acid (LYSTEDA) tablet 1,950 mg  - POCT glucose; Standing  - CBC auto differential; Future  - Comprehensive metabolic panel; Future  - Urinalysis; Future  - EKG 12-lead; Future  - Inpatient consult to Anesthesiology; Standing  - Place in Outpatient;  Standing  - Place FRIDA hose; Standing  - Place sequential compression device; Standing  - Chlorohexidine Gluconate Bath; Standing  - Case Request Operating Room: ROBOTIC ARTHROPLASTY, KNEE, TOTAL    2. Pre-op testing  - Vital signs; Standing  - Insert peripheral IV; Standing  - Clip and Prep Other (please specifiy) (Operative site); Standing  - Cleanse with Chlorhexidine (CHG); Standing  - Diet NPO; Standing  - electrolyte-A infusion  - IP VTE LOW RISK PATIENT; Standing  - ceFAZolin (ANCEF) 2 g in dextrose 5 % (D5W) 50 mL IVPB  - acetaminophen tablet 1,000 mg  - gabapentin capsule 300 mg  - ondansetron disintegrating tablet 4 mg  - scopolamine 1.3-1.5 mg (1 mg over 3 days) 1 patch  - tranexamic acid (LYSTEDA) tablet 1,950 mg  - POCT glucose; Standing  - CBC auto differential; Future  - Comprehensive metabolic panel; Future  - Urinalysis; Future  - EKG 12-lead; Future  - Inpatient consult to Anesthesiology; Standing  - Place in Outpatient; Standing  - Place FRIDA hose; Standing  - Place sequential compression device; Standing  - Chlorohexidine Gluconate Bath; Standing  - Case Request Operating Room: ROBOTIC ARTHROPLASTY, KNEE, TOTAL       Plan:  Plan for right total knee arthroplasty with [employer] robotic assistance at [facility] on 12/01/2023. The patient has been given preoperative instructions. Post-operative appointment is scheduled for 2 weeks.  We will obtain PCP clearance from Dr. Childers.  Patient has been holding wegovy.

## 2023-11-28 ENCOUNTER — ANESTHESIA EVENT (OUTPATIENT)
Dept: SURGERY | Facility: HOSPITAL | Age: 58
End: 2023-11-28
Payer: COMMERCIAL

## 2023-11-29 ENCOUNTER — TELEPHONE (OUTPATIENT)
Dept: ORTHOPEDICS | Facility: CLINIC | Age: 58
End: 2023-11-29
Payer: COMMERCIAL

## 2023-11-29 NOTE — TELEPHONE ENCOUNTER
Spoke to dr rajan's office. Spoke to mulugeta. She stated that she will get the clearance taken care of and faxed over to me.

## 2023-11-30 RX ORDER — LIDOCAINE HYDROCHLORIDE 10 MG/ML
1 INJECTION, SOLUTION EPIDURAL; INFILTRATION; INTRACAUDAL; PERINEURAL ONCE
Status: CANCELLED | OUTPATIENT
Start: 2023-11-30 | End: 2023-11-30

## 2023-11-30 NOTE — ANESTHESIA PREPROCEDURE EVALUATION
"                                                                                                             11/30/2023  Collette G Cormier is a 58 y.o., female, who presents for the following:    Procedure: ROBOTIC ARTHROPLASTY, KNEE, TOTAL (Right: Knee)   Anesthesia type: Spinal   Diagnosis:      Primary osteoarthritis of right knee [M17.11]      Pre-op testing [Z01.818]   Pre-op diagnosis:      Primary osteoarthritis of right knee [M17.11]      Pre-op testing [Z01.818]   Location: [facility] OR  / [facility] OR   Surgeons: Tomas Gordillo MD     HT: 5'2"    Original sx for her RT TKA was cancelled 3 wks ago for uncontrolled HTN  Pt took her BP meds @ home prior to arrival , today.  LAB:       10/16 1153 11/27 0941    HGB 14.7     13.5       WBC 11.16     9.35       Platelets 296     311            140       K+ 3.9     3.9       Creatinine 1.07 High     1.26 High       Glucose 105 High     128 High                Pre-op Assessment    I have reviewed the Patient Summary Reports.     I have reviewed the Nursing Notes. I have reviewed the NPO Status.   I have reviewed the Medications.     Review of Systems  Anesthesia Hx:  No problems with previous Anesthesia             Denies Family Hx of Anesthesia complications.    Denies Personal Hx of Anesthesia complications.                    Social:  Non-Smoker       Hematology/Oncology:                                  Oncology Comments: Breast CA     Cardiovascular:     Hypertension                                        Pulmonary:  Pulmonary Normal                       Endocrine:  Diabetes         Morbid Obesity / BMI > 40      Physical Exam  General: Alert and Oriented  Morbid Obesity  Airway:  Mallampati: III   Mouth Opening: Small, but > 3cm  TM Distance: Normal  Tongue: Large  Neck ROM: Normal ROM  Short Neck  Dental:  Intact    Chest/Lungs:  Normal Respiratory Rate    Heart:  Rate: Normal  Rhythm: Regular Rhythm        Anesthesia Plan  Type of Anesthesia, risks & " benefits discussed:    Anesthesia Type: Spinal  Intra-op Monitoring Plan: Standard ASA Monitors  Post Op Pain Control Plan: IV/PO Opioids PRN and peripheral nerve block  Induction:  IV  Airway Plan: Direct  Informed Consent: Informed consent signed with the Patient and all parties understand the risks and agree with anesthesia plan.  All questions answered. Patient consented to blood products? No  ASA Score: 3  Day of Surgery Review of History & Physical: H&P Update referred to the surgeon/provider.  Anesthesia Plan Notes: Nasal cannula vs facemask supplemental oxygenation   For patients with FLY/obesity, may consider SuperNoval Nasal CPAP  Spinal Anes w/ Propofol Sedation  Adductor Canal Blk for Postop analgesia per surgeon request      Ready For Surgery From Anesthesia Perspective.     .

## 2023-12-01 ENCOUNTER — HOSPITAL ENCOUNTER (OUTPATIENT)
Facility: HOSPITAL | Age: 58
Discharge: HOME | End: 2023-12-02
Attending: ORTHOPAEDIC SURGERY | Admitting: ORTHOPAEDIC SURGERY
Payer: COMMERCIAL

## 2023-12-01 ENCOUNTER — ANESTHESIA (OUTPATIENT)
Dept: SURGERY | Facility: HOSPITAL | Age: 58
End: 2023-12-01
Payer: COMMERCIAL

## 2023-12-01 DIAGNOSIS — M17.11 PRIMARY OSTEOARTHRITIS OF RIGHT KNEE: ICD-10-CM

## 2023-12-01 DIAGNOSIS — Z01.818 PRE-OP TESTING: ICD-10-CM

## 2023-12-01 LAB
HCT VFR BLD AUTO: 38.5 % (ref 37–47)
HGB BLD-MCNC: 12.3 G/DL (ref 12–16)
POCT GLUCOSE: 122 MG/DL (ref 70–110)

## 2023-12-01 PROCEDURE — D9220A PRA ANESTHESIA: ICD-10-PCS | Mod: CRNA,,, | Performed by: NURSE ANESTHETIST, CERTIFIED REGISTERED

## 2023-12-01 PROCEDURE — 36000713 HC OR TIME LEV V EA ADD 15 MIN: Performed by: ORTHOPAEDIC SURGERY

## 2023-12-01 PROCEDURE — 71000039 HC RECOVERY, EACH ADD'L HOUR: Performed by: ORTHOPAEDIC SURGERY

## 2023-12-01 PROCEDURE — 94761 N-INVAS EAR/PLS OXIMETRY MLT: CPT

## 2023-12-01 PROCEDURE — 25000003 PHARM REV CODE 250

## 2023-12-01 PROCEDURE — 63600175 PHARM REV CODE 636 W HCPCS: Performed by: ORTHOPAEDIC SURGERY

## 2023-12-01 PROCEDURE — C1713 ANCHOR/SCREW BN/BN,TIS/BN: HCPCS | Performed by: ORTHOPAEDIC SURGERY

## 2023-12-01 PROCEDURE — 63600175 PHARM REV CODE 636 W HCPCS

## 2023-12-01 PROCEDURE — 27447 PR TOTAL KNEE ARTHROPLASTY: ICD-10-PCS | Mod: AS,RT,,

## 2023-12-01 PROCEDURE — 0055T PR COMPUTER-ASSIST MUSCSKEL NAVIG, ORTHO PROC, CT/MRI: ICD-10-PCS | Mod: ,,, | Performed by: ORTHOPAEDIC SURGERY

## 2023-12-01 PROCEDURE — 64447 NJX AA&/STRD FEMORAL NRV IMG: CPT | Performed by: ANESTHESIOLOGY

## 2023-12-01 PROCEDURE — 27447 PR TOTAL KNEE ARTHROPLASTY: ICD-10-PCS | Mod: RT,,, | Performed by: ORTHOPAEDIC SURGERY

## 2023-12-01 PROCEDURE — 63600175 PHARM REV CODE 636 W HCPCS: Performed by: NURSE ANESTHETIST, CERTIFIED REGISTERED

## 2023-12-01 PROCEDURE — 64447 NJX AA&/STRD FEMORAL NRV IMG: CPT | Mod: 59,RT,, | Performed by: ANESTHESIOLOGY

## 2023-12-01 PROCEDURE — D9220A PRA ANESTHESIA: Mod: ANES,,, | Performed by: ANESTHESIOLOGY

## 2023-12-01 PROCEDURE — 88305 TISSUE EXAM BY PATHOLOGIST: CPT | Performed by: ORTHOPAEDIC SURGERY

## 2023-12-01 PROCEDURE — 64447 PERIPHERAL BLOCK: ICD-10-PCS | Mod: 59,RT,, | Performed by: ANESTHESIOLOGY

## 2023-12-01 PROCEDURE — 63600175 PHARM REV CODE 636 W HCPCS: Performed by: ANESTHESIOLOGY

## 2023-12-01 PROCEDURE — 37000008 HC ANESTHESIA 1ST 15 MINUTES: Performed by: ORTHOPAEDIC SURGERY

## 2023-12-01 PROCEDURE — 25000003 PHARM REV CODE 250: Performed by: ORTHOPAEDIC SURGERY

## 2023-12-01 PROCEDURE — 27447 TOTAL KNEE ARTHROPLASTY: CPT | Mod: AS,RT,,

## 2023-12-01 PROCEDURE — G0378 HOSPITAL OBSERVATION PER HR: HCPCS

## 2023-12-01 PROCEDURE — 82962 GLUCOSE BLOOD TEST: CPT | Performed by: ORTHOPAEDIC SURGERY

## 2023-12-01 PROCEDURE — 85014 HEMATOCRIT: CPT

## 2023-12-01 PROCEDURE — C1776 JOINT DEVICE (IMPLANTABLE): HCPCS | Performed by: ORTHOPAEDIC SURGERY

## 2023-12-01 PROCEDURE — 71000033 HC RECOVERY, INTIAL HOUR: Performed by: ORTHOPAEDIC SURGERY

## 2023-12-01 PROCEDURE — 27201423 OPTIME MED/SURG SUP & DEVICES STERILE SUPPLY: Performed by: ORTHOPAEDIC SURGERY

## 2023-12-01 PROCEDURE — D9220A PRA ANESTHESIA: ICD-10-PCS | Mod: ANES,,, | Performed by: ANESTHESIOLOGY

## 2023-12-01 PROCEDURE — 99900035 HC TECH TIME PER 15 MIN (STAT)

## 2023-12-01 PROCEDURE — 36000712 HC OR TIME LEV V 1ST 15 MIN: Performed by: ORTHOPAEDIC SURGERY

## 2023-12-01 PROCEDURE — 25000003 PHARM REV CODE 250: Performed by: NURSE ANESTHETIST, CERTIFIED REGISTERED

## 2023-12-01 PROCEDURE — 0055T BONE SRGRY CMPTR CT/MRI IMAG: CPT | Mod: ,,, | Performed by: ORTHOPAEDIC SURGERY

## 2023-12-01 PROCEDURE — 88311 DECALCIFY TISSUE: CPT

## 2023-12-01 PROCEDURE — A4216 STERILE WATER/SALINE, 10 ML: HCPCS | Performed by: ORTHOPAEDIC SURGERY

## 2023-12-01 PROCEDURE — 51798 US URINE CAPACITY MEASURE: CPT | Performed by: ORTHOPAEDIC SURGERY

## 2023-12-01 PROCEDURE — D9220A PRA ANESTHESIA: Mod: CRNA,,, | Performed by: NURSE ANESTHETIST, CERTIFIED REGISTERED

## 2023-12-01 PROCEDURE — 97163 PT EVAL HIGH COMPLEX 45 MIN: CPT

## 2023-12-01 PROCEDURE — 37000009 HC ANESTHESIA EA ADD 15 MINS: Performed by: ORTHOPAEDIC SURGERY

## 2023-12-01 PROCEDURE — 27447 TOTAL KNEE ARTHROPLASTY: CPT | Mod: RT,,, | Performed by: ORTHOPAEDIC SURGERY

## 2023-12-01 DEVICE — ASYMMETRIC PATELLA
Type: IMPLANTABLE DEVICE | Site: KNEE | Status: FUNCTIONAL
Brand: TRIATHLON

## 2023-12-01 DEVICE — TIBIAL BEARING INSERT - CS
Type: IMPLANTABLE DEVICE | Site: KNEE | Status: FUNCTIONAL
Brand: TRIATHLON

## 2023-12-01 DEVICE — PRIMARY TIBIAL BASEPLATE
Type: IMPLANTABLE DEVICE | Site: KNEE | Status: FUNCTIONAL
Brand: TRIATHLON

## 2023-12-01 DEVICE — CRUCIATE RETAINING FEMORAL
Type: IMPLANTABLE DEVICE | Site: KNEE | Status: FUNCTIONAL
Brand: TRIATHLON

## 2023-12-01 DEVICE — CEMENT BONE ANTIBIO SIMPLEX P: Type: IMPLANTABLE DEVICE | Site: KNEE | Status: FUNCTIONAL

## 2023-12-01 RX ORDER — DOCUSATE SODIUM 100 MG/1
200 CAPSULE, LIQUID FILLED ORAL DAILY
Status: DISCONTINUED | OUTPATIENT
Start: 2023-12-02 | End: 2023-12-02 | Stop reason: HOSPADM

## 2023-12-01 RX ORDER — SODIUM CHLORIDE 9 MG/ML
INJECTION, SOLUTION INTRAMUSCULAR; INTRAVENOUS; SUBCUTANEOUS
Status: DISCONTINUED | OUTPATIENT
Start: 2023-12-01 | End: 2023-12-01 | Stop reason: HOSPADM

## 2023-12-01 RX ORDER — ROPIVACAINE HYDROCHLORIDE 5 MG/ML
INJECTION, SOLUTION EPIDURAL; INFILTRATION; PERINEURAL
Status: DISCONTINUED | OUTPATIENT
Start: 2023-12-01 | End: 2023-12-01 | Stop reason: HOSPADM

## 2023-12-01 RX ORDER — GLYCOPYRROLATE 0.2 MG/ML
INJECTION INTRAMUSCULAR; INTRAVENOUS
Status: DISCONTINUED | OUTPATIENT
Start: 2023-12-01 | End: 2023-12-01

## 2023-12-01 RX ORDER — INSULIN ASPART 100 [IU]/ML
1-10 INJECTION, SOLUTION INTRAVENOUS; SUBCUTANEOUS
Status: DISCONTINUED | OUTPATIENT
Start: 2023-12-01 | End: 2023-12-02 | Stop reason: HOSPADM

## 2023-12-01 RX ORDER — POLYETHYLENE GLYCOL 3350 17 G/17G
17 POWDER, FOR SOLUTION ORAL NIGHTLY
Status: DISCONTINUED | OUTPATIENT
Start: 2023-12-01 | End: 2023-12-02 | Stop reason: HOSPADM

## 2023-12-01 RX ORDER — BISACODYL 10 MG
10 SUPPOSITORY, RECTAL RECTAL DAILY
Status: DISCONTINUED | OUTPATIENT
Start: 2023-12-04 | End: 2023-12-02 | Stop reason: HOSPADM

## 2023-12-01 RX ORDER — ROPIVACAINE HYDROCHLORIDE 5 MG/ML
INJECTION, SOLUTION EPIDURAL; INFILTRATION; PERINEURAL
Status: DISCONTINUED | OUTPATIENT
Start: 2023-12-01 | End: 2023-12-01

## 2023-12-01 RX ORDER — IBUPROFEN 200 MG
24 TABLET ORAL
Status: DISCONTINUED | OUTPATIENT
Start: 2023-12-01 | End: 2023-12-02 | Stop reason: HOSPADM

## 2023-12-01 RX ORDER — TALC
6 POWDER (GRAM) TOPICAL NIGHTLY PRN
Status: DISCONTINUED | OUTPATIENT
Start: 2023-12-01 | End: 2023-12-02 | Stop reason: HOSPADM

## 2023-12-01 RX ORDER — LACTULOSE 10 G/15ML
20 SOLUTION ORAL EVERY 6 HOURS PRN
Status: DISCONTINUED | OUTPATIENT
Start: 2023-12-01 | End: 2023-12-02 | Stop reason: HOSPADM

## 2023-12-01 RX ORDER — LISINOPRIL 10 MG/1
20 TABLET ORAL DAILY
Status: DISCONTINUED | OUTPATIENT
Start: 2023-12-01 | End: 2023-12-02 | Stop reason: HOSPADM

## 2023-12-01 RX ORDER — HYDROCODONE BITARTRATE AND ACETAMINOPHEN 7.5; 325 MG/1; MG/1
1 TABLET ORAL EVERY 4 HOURS PRN
Status: DISCONTINUED | OUTPATIENT
Start: 2023-12-01 | End: 2023-12-02 | Stop reason: HOSPADM

## 2023-12-01 RX ORDER — MIDAZOLAM HYDROCHLORIDE 1 MG/ML
INJECTION INTRAMUSCULAR; INTRAVENOUS
Status: DISCONTINUED | OUTPATIENT
Start: 2023-12-01 | End: 2023-12-01

## 2023-12-01 RX ORDER — METOCLOPRAMIDE HYDROCHLORIDE 5 MG/ML
10 INJECTION INTRAMUSCULAR; INTRAVENOUS
Status: DISCONTINUED | OUTPATIENT
Start: 2023-12-01 | End: 2023-12-01

## 2023-12-01 RX ORDER — ONDANSETRON 2 MG/ML
4 INJECTION INTRAMUSCULAR; INTRAVENOUS ONCE AS NEEDED
Status: DISCONTINUED | OUTPATIENT
Start: 2023-12-01 | End: 2023-12-01 | Stop reason: HOSPADM

## 2023-12-01 RX ORDER — HYDROMORPHONE HYDROCHLORIDE 2 MG/ML
0.4 INJECTION, SOLUTION INTRAMUSCULAR; INTRAVENOUS; SUBCUTANEOUS EVERY 5 MIN PRN
Status: DISCONTINUED | OUTPATIENT
Start: 2023-12-01 | End: 2023-12-01 | Stop reason: HOSPADM

## 2023-12-01 RX ORDER — DEXAMETHASONE SODIUM PHOSPHATE 4 MG/ML
INJECTION, SOLUTION INTRA-ARTICULAR; INTRALESIONAL; INTRAMUSCULAR; INTRAVENOUS; SOFT TISSUE
Status: DISCONTINUED | OUTPATIENT
Start: 2023-12-01 | End: 2023-12-01

## 2023-12-01 RX ORDER — HYDROCODONE BITARTRATE AND ACETAMINOPHEN 10; 325 MG/1; MG/1
1 TABLET ORAL EVERY 4 HOURS PRN
Status: DISCONTINUED | OUTPATIENT
Start: 2023-12-01 | End: 2023-12-02 | Stop reason: HOSPADM

## 2023-12-01 RX ORDER — KETOROLAC TROMETHAMINE 30 MG/ML
INJECTION, SOLUTION INTRAMUSCULAR; INTRAVENOUS
Status: DISCONTINUED | OUTPATIENT
Start: 2023-12-01 | End: 2023-12-01 | Stop reason: HOSPADM

## 2023-12-01 RX ORDER — SODIUM CHLORIDE 9 MG/ML
INJECTION, SOLUTION INTRAVENOUS CONTINUOUS
Status: DISCONTINUED | OUTPATIENT
Start: 2023-12-01 | End: 2023-12-02 | Stop reason: HOSPADM

## 2023-12-01 RX ORDER — GABAPENTIN 300 MG/1
300 CAPSULE ORAL NIGHTLY
Status: DISCONTINUED | OUTPATIENT
Start: 2023-12-01 | End: 2023-12-02 | Stop reason: HOSPADM

## 2023-12-01 RX ORDER — MAG HYDROX/ALUMINUM HYD/SIMETH 200-200-20
30 SUSPENSION, ORAL (FINAL DOSE FORM) ORAL EVERY 6 HOURS PRN
Status: DISCONTINUED | OUTPATIENT
Start: 2023-12-01 | End: 2023-12-02 | Stop reason: HOSPADM

## 2023-12-01 RX ORDER — HYDRALAZINE HYDROCHLORIDE 25 MG/1
25 TABLET, FILM COATED ORAL 2 TIMES DAILY
Status: DISCONTINUED | OUTPATIENT
Start: 2023-12-01 | End: 2023-12-02 | Stop reason: HOSPADM

## 2023-12-01 RX ORDER — METHOCARBAMOL 750 MG/1
750 TABLET, FILM COATED ORAL EVERY 8 HOURS PRN
Status: DISCONTINUED | OUTPATIENT
Start: 2023-12-01 | End: 2023-12-02 | Stop reason: HOSPADM

## 2023-12-01 RX ORDER — KETOROLAC TROMETHAMINE 30 MG/ML
30 INJECTION, SOLUTION INTRAMUSCULAR; INTRAVENOUS EVERY 6 HOURS PRN
Status: DISCONTINUED | OUTPATIENT
Start: 2023-12-01 | End: 2023-12-01 | Stop reason: HOSPADM

## 2023-12-01 RX ORDER — PROPOFOL 10 MG/ML
VIAL (ML) INTRAVENOUS CONTINUOUS PRN
Status: DISCONTINUED | OUTPATIENT
Start: 2023-12-01 | End: 2023-12-01

## 2023-12-01 RX ORDER — HYDROCODONE BITARTRATE AND ACETAMINOPHEN 5; 325 MG/1; MG/1
1 TABLET ORAL EVERY 4 HOURS PRN
Status: DISCONTINUED | OUTPATIENT
Start: 2023-12-01 | End: 2023-12-02 | Stop reason: HOSPADM

## 2023-12-01 RX ORDER — FAMOTIDINE 20 MG/1
20 TABLET, FILM COATED ORAL 2 TIMES DAILY
Status: DISCONTINUED | OUTPATIENT
Start: 2023-12-01 | End: 2023-12-02 | Stop reason: HOSPADM

## 2023-12-01 RX ORDER — EPINEPHRINE 1 MG/ML
INJECTION, SOLUTION, CONCENTRATE INTRAVENOUS
Status: DISPENSED
Start: 2023-12-01 | End: 2023-12-01

## 2023-12-01 RX ORDER — SODIUM CHLORIDE 0.9 % (FLUSH) 0.9 %
SYRINGE (ML) INJECTION
Status: DISPENSED
Start: 2023-12-01 | End: 2023-12-01

## 2023-12-01 RX ORDER — SODIUM CHLORIDE, SODIUM GLUCONATE, SODIUM ACETATE, POTASSIUM CHLORIDE AND MAGNESIUM CHLORIDE 30; 37; 368; 526; 502 MG/100ML; MG/100ML; MG/100ML; MG/100ML; MG/100ML
INJECTION, SOLUTION INTRAVENOUS CONTINUOUS
Status: DISCONTINUED | OUTPATIENT
Start: 2023-12-01 | End: 2023-12-01

## 2023-12-01 RX ORDER — METOCLOPRAMIDE 10 MG/1
10 TABLET ORAL
Status: DISCONTINUED | OUTPATIENT
Start: 2023-12-01 | End: 2023-12-02 | Stop reason: HOSPADM

## 2023-12-01 RX ORDER — ANASTROZOLE 1 MG/1
1 TABLET ORAL DAILY
Status: DISCONTINUED | OUTPATIENT
Start: 2023-12-02 | End: 2023-12-02 | Stop reason: HOSPADM

## 2023-12-01 RX ORDER — AMOXICILLIN 250 MG
2 CAPSULE ORAL 2 TIMES DAILY
Status: DISCONTINUED | OUTPATIENT
Start: 2023-12-01 | End: 2023-12-02 | Stop reason: HOSPADM

## 2023-12-01 RX ORDER — KETOROLAC TROMETHAMINE 30 MG/ML
INJECTION, SOLUTION INTRAMUSCULAR; INTRAVENOUS
Status: DISCONTINUED
Start: 2023-12-01 | End: 2023-12-01 | Stop reason: WASHOUT

## 2023-12-01 RX ORDER — GLUCAGON 1 MG
1 KIT INJECTION
Status: DISCONTINUED | OUTPATIENT
Start: 2023-12-01 | End: 2023-12-02 | Stop reason: HOSPADM

## 2023-12-01 RX ORDER — TRANEXAMIC ACID 650 MG/1
1950 TABLET ORAL
Status: COMPLETED | OUTPATIENT
Start: 2023-12-01 | End: 2023-12-01

## 2023-12-01 RX ORDER — MORPHINE SULFATE 10 MG/ML
INJECTION INTRAMUSCULAR; INTRAVENOUS; SUBCUTANEOUS
Status: DISCONTINUED | OUTPATIENT
Start: 2023-12-01 | End: 2023-12-01 | Stop reason: HOSPADM

## 2023-12-01 RX ORDER — SCOLOPAMINE TRANSDERMAL SYSTEM 1 MG/1
1 PATCH, EXTENDED RELEASE TRANSDERMAL ONCE AS NEEDED
Status: DISCONTINUED | OUTPATIENT
Start: 2023-12-01 | End: 2023-12-01 | Stop reason: HOSPADM

## 2023-12-01 RX ORDER — DILTIAZEM HYDROCHLORIDE 180 MG/1
360 CAPSULE, COATED, EXTENDED RELEASE ORAL EVERY MORNING
Status: DISCONTINUED | OUTPATIENT
Start: 2023-12-02 | End: 2023-12-02 | Stop reason: HOSPADM

## 2023-12-01 RX ORDER — PHENYLEPHRINE HYDROCHLORIDE 10 MG/ML
INJECTION INTRAVENOUS
Status: DISCONTINUED | OUTPATIENT
Start: 2023-12-01 | End: 2023-12-01

## 2023-12-01 RX ORDER — GABAPENTIN 300 MG/1
300 CAPSULE ORAL
Status: COMPLETED | OUTPATIENT
Start: 2023-12-01 | End: 2023-12-01

## 2023-12-01 RX ORDER — ONDANSETRON 4 MG/1
4 TABLET, ORALLY DISINTEGRATING ORAL
Status: COMPLETED | OUTPATIENT
Start: 2023-12-01 | End: 2023-12-01

## 2023-12-01 RX ORDER — IBUPROFEN 200 MG
16 TABLET ORAL
Status: DISCONTINUED | OUTPATIENT
Start: 2023-12-01 | End: 2023-12-02 | Stop reason: HOSPADM

## 2023-12-01 RX ORDER — ONDANSETRON 2 MG/ML
INJECTION INTRAMUSCULAR; INTRAVENOUS
Status: DISCONTINUED | OUTPATIENT
Start: 2023-12-01 | End: 2023-12-01

## 2023-12-01 RX ORDER — KETOROLAC TROMETHAMINE 30 MG/ML
INJECTION, SOLUTION INTRAMUSCULAR; INTRAVENOUS
Status: DISPENSED
Start: 2023-12-01 | End: 2023-12-01

## 2023-12-01 RX ORDER — ACETAMINOPHEN 500 MG
1000 TABLET ORAL
Status: COMPLETED | OUTPATIENT
Start: 2023-12-01 | End: 2023-12-01

## 2023-12-01 RX ORDER — ONDANSETRON 2 MG/ML
4 INJECTION INTRAMUSCULAR; INTRAVENOUS EVERY 6 HOURS PRN
Status: DISCONTINUED | OUTPATIENT
Start: 2023-12-01 | End: 2023-12-02 | Stop reason: HOSPADM

## 2023-12-01 RX ORDER — EPINEPHRINE 1 MG/ML
INJECTION, SOLUTION, CONCENTRATE INTRAVENOUS
Status: DISCONTINUED | OUTPATIENT
Start: 2023-12-01 | End: 2023-12-01 | Stop reason: HOSPADM

## 2023-12-01 RX ORDER — NAPROXEN SODIUM 220 MG/1
81 TABLET, FILM COATED ORAL 2 TIMES DAILY
Status: DISCONTINUED | OUTPATIENT
Start: 2023-12-02 | End: 2023-12-02 | Stop reason: HOSPADM

## 2023-12-01 RX ORDER — BUPIVACAINE HYDROCHLORIDE 2.5 MG/ML
INJECTION, SOLUTION EPIDURAL; INFILTRATION; INTRACAUDAL
Status: DISPENSED
Start: 2023-12-01 | End: 2023-12-01

## 2023-12-01 RX ORDER — ACETAMINOPHEN 10 MG/ML
1000 INJECTION, SOLUTION INTRAVENOUS ONCE
Status: COMPLETED | OUTPATIENT
Start: 2023-12-01 | End: 2023-12-01

## 2023-12-01 RX ORDER — MORPHINE SULFATE 10 MG/ML
INJECTION INTRAMUSCULAR; INTRAVENOUS; SUBCUTANEOUS
Status: DISPENSED
Start: 2023-12-01 | End: 2023-12-01

## 2023-12-01 RX ORDER — ROPIVACAINE HYDROCHLORIDE 5 MG/ML
INJECTION, SOLUTION EPIDURAL; INFILTRATION; PERINEURAL
Status: COMPLETED
Start: 2023-12-01 | End: 2023-12-01

## 2023-12-01 RX ADMIN — ROPIVACAINE HYDROCHLORIDE 30 ML: 5 INJECTION, SOLUTION EPIDURAL; INFILTRATION; PERINEURAL at 11:12

## 2023-12-01 RX ADMIN — GLYCOPYRROLATE 0.2 MG: 0.2 INJECTION INTRAMUSCULAR; INTRAVENOUS at 08:12

## 2023-12-01 RX ADMIN — CEFAZOLIN 2 G: 2 INJECTION, POWDER, FOR SOLUTION INTRAMUSCULAR; INTRAVENOUS at 10:12

## 2023-12-01 RX ADMIN — SODIUM CHLORIDE, SODIUM GLUCONATE, SODIUM ACETATE, POTASSIUM CHLORIDE AND MAGNESIUM CHLORIDE: 526; 502; 368; 37; 30 INJECTION, SOLUTION INTRAVENOUS at 09:12

## 2023-12-01 RX ADMIN — ONDANSETRON HYDROCHLORIDE 4 MG: 2 SOLUTION INTRAMUSCULAR; INTRAVENOUS at 07:12

## 2023-12-01 RX ADMIN — DEXAMETHASONE SODIUM PHOSPHATE 8 MG: 4 INJECTION, SOLUTION INTRA-ARTICULAR; INTRALESIONAL; INTRAMUSCULAR; INTRAVENOUS; SOFT TISSUE at 07:12

## 2023-12-01 RX ADMIN — PHENYLEPHRINE HYDROCHLORIDE 15 MCG/MIN: 10 INJECTION INTRAVENOUS at 08:12

## 2023-12-01 RX ADMIN — FAMOTIDINE 20 MG: 20 TABLET, FILM COATED ORAL at 08:12

## 2023-12-01 RX ADMIN — SODIUM CHLORIDE, SODIUM GLUCONATE, SODIUM ACETATE, POTASSIUM CHLORIDE AND MAGNESIUM CHLORIDE: 526; 502; 368; 37; 30 INJECTION, SOLUTION INTRAVENOUS at 06:12

## 2023-12-01 RX ADMIN — TRANEXAMIC ACID 1950 MG: 650 TABLET ORAL at 06:12

## 2023-12-01 RX ADMIN — HYDROCODONE BITARTRATE AND ACETAMINOPHEN 1 TABLET: 5; 325 TABLET ORAL at 01:12

## 2023-12-01 RX ADMIN — SODIUM CHLORIDE, SODIUM GLUCONATE, SODIUM ACETATE, POTASSIUM CHLORIDE AND MAGNESIUM CHLORIDE: 526; 502; 368; 37; 30 INJECTION, SOLUTION INTRAVENOUS at 07:12

## 2023-12-01 RX ADMIN — SENNOSIDES AND DOCUSATE SODIUM 2 TABLET: 8.6; 5 TABLET ORAL at 08:12

## 2023-12-01 RX ADMIN — METHOCARBAMOL 750 MG: 750 TABLET ORAL at 08:12

## 2023-12-01 RX ADMIN — ONDANSETRON 4 MG: 4 TABLET, ORALLY DISINTEGRATING ORAL at 06:12

## 2023-12-01 RX ADMIN — ACETAMINOPHEN 1000 MG: 500 TABLET ORAL at 06:12

## 2023-12-01 RX ADMIN — METOCLOPRAMIDE 10 MG: 10 TABLET ORAL at 08:12

## 2023-12-01 RX ADMIN — POLYETHYLENE GLYCOL 3350 17 G: 17 POWDER, FOR SOLUTION ORAL at 08:12

## 2023-12-01 RX ADMIN — HYDROCODONE BITARTRATE AND ACETAMINOPHEN 1 TABLET: 5; 325 TABLET ORAL at 10:12

## 2023-12-01 RX ADMIN — CEFAZOLIN 2 G: 2 INJECTION, POWDER, FOR SOLUTION INTRAMUSCULAR; INTRAVENOUS at 04:12

## 2023-12-01 RX ADMIN — HYDROMORPHONE HYDROCHLORIDE 0.4 MG: 2 INJECTION INTRAMUSCULAR; INTRAVENOUS; SUBCUTANEOUS at 12:12

## 2023-12-01 RX ADMIN — PROPOFOL 100 MCG/KG/MIN: 10 INJECTION, EMULSION INTRAVENOUS at 08:12

## 2023-12-01 RX ADMIN — ACETAMINOPHEN 1000 MG: 1000 INJECTION INTRAVENOUS at 06:12

## 2023-12-01 RX ADMIN — GABAPENTIN 300 MG: 300 CAPSULE ORAL at 08:12

## 2023-12-01 RX ADMIN — CEFAZOLIN 2 G: 2 INJECTION, POWDER, FOR SOLUTION INTRAMUSCULAR; INTRAVENOUS at 08:12

## 2023-12-01 RX ADMIN — MIDAZOLAM 2 MG: 1 INJECTION INTRAMUSCULAR; INTRAVENOUS at 07:12

## 2023-12-01 RX ADMIN — GABAPENTIN 300 MG: 300 CAPSULE ORAL at 06:12

## 2023-12-01 RX ADMIN — PHENYLEPHRINE HYDROCHLORIDE 50 MCG: 10 INJECTION INTRAVENOUS at 08:12

## 2023-12-01 RX ADMIN — METOCLOPRAMIDE 10 MG: 5 INJECTION, SOLUTION INTRAMUSCULAR; INTRAVENOUS at 01:12

## 2023-12-01 NOTE — NURSING
Nurses Note -- 4 Eyes      12/1/2023   2:12 PM      Skin assessed during: Admit      [x] No Altered Skin Integrity Present    []Prevention Measures Documented      [] Yes- Altered Skin Integrity Present or Discovered   [] LDA Added if Not in Epic (Describe Wound)   [] New Altered Skin Integrity was Present on Admit and Documented in LDA   [] Wound Image Taken    Wound Care Consulted? No    Attending Nurse:  Linette Case RN/Staff Member:  Maura

## 2023-12-01 NOTE — ANESTHESIA POSTPROCEDURE EVALUATION
Anesthesia Post Evaluation    Patient: Collette G Cormier    Procedure(s) Performed: Procedure(s) (LRB):  ROBOTIC ARTHROPLASTY, KNEE, TOTAL (Right)    Final Anesthesia Type: general      Patient location during evaluation: PACU  Patient participation: Yes- Able to Participate  Level of consciousness: oriented and awake  Post-procedure vital signs: reviewed and stable  Pain management: adequate  Airway patency: patent    PONV status at discharge: No PONV  Anesthetic complications: no      Cardiovascular status: stable and hemodynamically stable  Respiratory status: spontaneous ventilation and unassisted  Hydration status: euvolemic  Follow-up not needed.  Comments: [facility]        NEURO: Neuraxial block resolving      Vitals Value Taken Time   /73 12/01/23 1512   Temp 36.4 °C (97.5 °F) 12/01/23 1300   Pulse 68 12/01/23 1512   Resp 18 12/01/23 1359   SpO2 90 % 12/01/23 1512         Event Time   Out of Recovery 13:08:00         Pain/Celeste Score: Pain Rating Prior to Med Admin: 3 (12/1/2023  1:59 PM)  Celeste Score: 8 (12/1/2023 12:30 PM)

## 2023-12-01 NOTE — BRIEF OP NOTE
[facility] - Periop Services  Brief Operative Note    SUMMARY     Surgery Date: 12/1/2023     Surgeon(s) and Role:     * Tomas Gordillo MD - Primary    Assisting Surgeon: None    Pre-op Diagnosis:  Primary osteoarthritis of right knee [M17.11]  Pre-op testing [Z01.818]    Post-op Diagnosis:  Post-Op Diagnosis Codes:     * Primary osteoarthritis of right knee [M17.11]     * Pre-op testing [Z01.818]    Procedure(s) (LRB):  ROBOTIC ARTHROPLASTY, KNEE, TOTAL (Right)    Anesthesia: Spinal    Implants:  Implant Name Type Inv. Item Serial No.  Lot No. LRB No. Used Action   PATELLA TRI 29X9 X3 POLYETHYLE - [id number]  PATELLA TRI 29X9 X3 POLYETHYLE  [employer]. [id number] Right 1 Implanted   INSERT TRIATHLON TIB 10MM SZ 2 - [id number]  INSERT TRIATHLON TIB 10MM SZ 2  [employer]. [id number] Right 1 Implanted   BASEPLATE TIB RADHA PRIM SZ 2 - [id number]  BASEPLATE TIB RADHA PRIM SZ 2  [employer]. [id number] Right 1 Implanted   COMPONENT CR FEM CEMENTED 1 R - [id number]  COMPONENT CR FEM CEMENTED 1 R  [employer]. [id number] Right 1 Implanted       Operative Findings: R TKA    Estimated Blood Loss: * No values recorded between 12/1/2023  8:34 AM and 12/1/2023  9:57 AM *    Estimated Blood Loss has been documented.         Specimens:   Specimen (24h ago, onward)       Start     Ordered    12/01/23 0658  Specimen to Pathology  RELEASE UPON ORDERING        References:    Click here for ordering Quick Tip   Question:  Release to patient  Answer:  Immediate    12/01/23 0658                    [id number]       Otezla Pregnancy And Lactation Text: This medication is Pregnancy Category C and it isn't known if it is safe during pregnancy. It is unknown if it is excreted in breast milk.

## 2023-12-01 NOTE — PT/OT/SLP EVAL
Physical Therapy Evaluation    Patient Name:  Collette G Cormier   MRN:  [id number]    Recommendations:     Discharge Recommendations: Low Intensity Therapy   Discharge Equipment Recommendations: walker, rolling   Barriers to discharge:  impaired functional mobility    Assessment:     Collette G Cormier is a 58 y.o. female admitted with a medical diagnosis of Primary osteoarthritis of right knee.  She presents with the following impairments/functional limitations: weakness, impaired endurance, decreased ROM, impaired self care skills, impaired functional mobility, decreased lower extremity function, edema, pain.    Rehab Prognosis: Good; patient would benefit from acute skilled PT services to address these deficits and reach maximum level of function.    Recent Surgery: Procedure(s) (LRB):  ROBOTIC ARTHROPLASTY, KNEE, TOTAL (Right) Day of Surgery    Plan:     During this hospitalization, patient to be seen BID to address the identified rehab impairments via gait training, therapeutic activities, therapeutic exercises and progress toward the following goals:    Plan of Care Expires:       Subjective     Chief Complaint: R knee pain  Patient/Family Comments/goals: pt agreeable to participate with PT. Motivated and cooperative.  Pain/Comfort:  Pain Rating 1: 6/10  Location - Side 1: Right  Location 1: knee  Pain Addressed 1: Reposition, Cessation of Activity, Nurse notified    Patients cultural, spiritual, Lutheran conflicts given the current situation: no    Living Environment:  Pt stated that she was independent at home without use of an AD. Pt has 4 stairs without rails to enter her home.    Pt was performing a regular exercise program prior to surgery.    Prior to admission, patients level of function was independent.  Equipment used at home: none.  Upon discharge, patient will have assistance from her spouse and daughter.    Objective:     Communicated with RN prior to session.  Patient found supine with SCD,  telemetry, pulse ox (continuous), pressure relief boots, peripheral IV, oxygen, blood pressure cuff  upon PT entry to room.    General Precautions: Standard, fall  Orthopedic Precautions:RLE weight bearing as tolerated   Braces: N/A  Respiratory Status: Nasal cannula, flow 2 L/min upon entry to room; returned to room air.    Exams:  Cognitive Exam:  Patient is oriented to Person, Place, Time, and Situation    ROM:   (In degrees) AROM PROM   R knee flexion 93    R knee extension 0      Functional Mobility:  Bed Mobility:     Supine to Sit: contact guard assistance  Transfers:     Sit to Stand:  contact guard assistance with rolling walker  Bed to Chair: contact guard assistance with  rolling walker  using  Stand Pivot  Gait: pt amb 100' CGA with RW. Slow gait speed, initial step-to gait on the L but progressed to continuous step-through gait. No significant unsteadiness noted.    Patient left up in chair with all lines intact, call button in reach, RN notified, pts spouse present, and vitals monitor reattached .    GOALS:   Multidisciplinary Problems       Physical Therapy Goals          Problem: Physical Therapy    Goal Priority Disciplines Outcome Goal Variances Interventions   Physical Therapy Goal     PT, PT/OT Ongoing, Progressing     Description: Pt will improve functional independence by performing:    Bed mobility: SBA  Sit to stand: SBA  Car Transfer: Min A  with rolling walker  Ambulation x 200'  feet with SBA and rolling walker  1 Step (Curb): Min A  and rolling walker  4 Steps: Min A  and RW vs HHA  right knee AROM flexion (in degrees): 0  right knee AROM extension (in degrees): 95   Independent with total knee HEP                           History:     Past Medical History:   Diagnosis Date    Arthritis     Cancer     Breast cancer-right    Digestive disorder     reflux    Hypertension        Past Surgical History:   Procedure Laterality Date    BREAST LUMPECTOMY Right     x1 lymph node removed     COLONOSCOPY      HYSTERECTOMY      partial    REPAIR OF MENISCUS OF KNEE Right        Time Tracking:     PT Received On:    PT Start Time: 1345     PT Stop Time: 1415  PT Total Time (min): 30 min     Billable Minutes: Evaluation 30 min      12/01/2023

## 2023-12-01 NOTE — ANESTHESIA PROCEDURE NOTES
Peripheral Block    Patient location during procedure: post-op   Block not for primary anesthetic.  Reason for block: at surgeon's request and post-op pain management   Post-op Pain Location: Knee, Right   Start time: 12/1/2023 11:18 AM  Timeout: 12/1/2023 11:16 AM   End time: 12/1/2023 11:20 AM    Staffing  Authorizing Provider: Harris Kim MD  Performing Provider: Harris Kim MD    Staffing  Performed by: Harris Kim MD  Authorized by: Harris Kim MD    Preanesthetic Checklist  Completed: patient identified, IV checked, site marked, risks and benefits discussed, surgical consent, monitors and equipment checked, pre-op evaluation and timeout performed  Peripheral Block  Patient position: supine  Prep: ChloraPrep  Patient monitoring: heart rate, cardiac monitor, continuous pulse ox and frequent blood pressure checks  Block type: adductor canal  Laterality: right  Injection technique: single shot  Needle  Needle type: Stimuplex   Needle gauge: 20 G  Needle length: 4 in  Needle localization: ultrasound guidance and anatomical landmarks   -ultrasound image captured on disc.  Assessment  Injection assessment: negative aspiration, negative parasthesia and local visualized surrounding nerve  Paresthesia pain: none  Heart rate change: no  Slow fractionated injection: yes  Pain Tolerance: comfortable throughout block and no complaints  Medications:    Medications: ropivacaine (NAROPIN) injection 0.5% - Perineural   30 mL - 12/1/2023 11:19:00 AM    Additional Notes  VSS.  RN monitoring vitals throughout procedure.  Patient tolerated procedure well.    Ultrasound guidance used to visualize the nerve bundle and sheath as well as confirm needle placement and deposition of the local anesthetic.  (1) Under ultrasound guidance, needle was inserted and placed in close proximity to the nerve bundle  (2) Ultrasound was also used to visualize the spread of the anesthetic in close proximity to the  femoral artery  (3) The nerves appeared anatomically normal  (4) There were no apparent abnormal pathological findings    Ultrasound photos archived.  Pt tolerated procedure well. There were no immediate complications.

## 2023-12-01 NOTE — ANESTHESIA PROCEDURE NOTES
Spinal    Diagnosis: Rt knee OA  Patient location during procedure: OR  Start time: 12/1/2023 8:03 AM  Timeout: 12/1/2023 8:01 AM  End time: 12/1/2023 8:06 AM    Staffing  Authorizing Provider: Harris Kim MD  Performing Provider: Harris Kim MD    Staffing  Performed by: Harris Kim MD  Authorized by: Harris Kim MD    Preanesthetic Checklist  Completed: patient identified, IV checked, risks and benefits discussed, surgical consent, monitors and equipment checked, pre-op evaluation and timeout performed  Spinal Block  Patient position: sitting  Prep: Betadine and ChloraPrep  Patient monitoring: cardiac monitor, heart rate, continuous pulse ox and frequent blood pressure checks  Approach: midline  Location: L3-4  Injection technique: single shot  CSF Fluid: clear free-flowing CSF  Needle  Needle gauge: 25 G  Additional Documentation: negative aspiration for heme and no paresthesia on injection  Needle localization: anatomical landmarks  Assessment  Sensory level: T7   Dermatomal levels determined by alcohol wipe and pinch or prick  Ease of block: easy  Patient's tolerance of the procedure: comfortable throughout block and no complaints

## 2023-12-01 NOTE — PLAN OF CARE
12/01/23 1531   Discharge Assessment   Assessment Type Discharge Planning Assessment   Source of Information patient;family;health record   Communicated ANTONIO with patient/caregiver Yes   Reason For Admission s/p TKR   People in Home spouse   Do you expect to return to your current living situation? Yes   Do you have help at home or someone to help you manage your care at home? Yes   Who are your caregiver(s) and their phone number(s)?  - SHREYAS [phone]   Prior to hospitilization cognitive status: Alert/Oriented   Current cognitive status: Alert/Oriented   Walking or Climbing Stairs ambulation difficulty, requires equipment   Dressing/Bathing bathing difficulty, requires equipment   Equipment Currently Used at Home walker, rolling   Readmission within 30 days? No   Patient currently being followed by outpatient case management? No   Do you currently have service(s) that help you manage your care at home? No   Do you take prescription medications? Yes   Do you have prescription coverage? Yes   Do you have any problems affording any of your prescribed medications? No   Is the patient taking medications as prescribed? yes   Who is going to help you get home at discharge? [facility]   How do you get to doctors appointments? car, drives self   Are you on dialysis? No   Do you take coumadin? No   DME Needed Upon Discharge  walker, rolling   Discharge Plan discussed with: Patient;Spouse/sig other   Transition of Care Barriers None   Discharge Plan A Home with family   Discharge Plan B Home with family     S/p TKR. Spk w pt ... Shreyas to  w homecare. Pt has RW. Provider list given. Foc obtained.   Called referral for outpatient therapy to [facility] ( [place]). They will contact pt with appt date & time.   PCP: Dr Slaughter  Contact # Shreyas [phone]      Patient DID participate in a pre-op exercise regimen

## 2023-12-01 NOTE — PLAN OF CARE
Problem: Adult Inpatient Plan of Care  Goal: Plan of Care Review  Outcome: Ongoing, Progressing  Goal: Patient-Specific Goal (Individualized)  Outcome: Ongoing, Progressing  Goal: Absence of Hospital-Acquired Illness or Injury  Outcome: Ongoing, Progressing  Goal: Optimal Comfort and Wellbeing  Outcome: Ongoing, Progressing  Goal: Readiness for Transition of Care  Outcome: Ongoing, Progressing     Problem: Bariatric Environmental Safety  Goal: Safety Maintained with Care  Outcome: Ongoing, Progressing     Problem: Infection  Goal: Absence of Infection Signs and Symptoms  Outcome: Ongoing, Progressing     Problem: Adjustment to Surgery (Knee Arthroplasty)  Goal: Optimal Coping  Outcome: Ongoing, Progressing     Problem: Bleeding (Knee Arthroplasty)  Goal: Absence of Bleeding  Outcome: Ongoing, Progressing     Problem: Bowel Motility Impaired (Knee Arthroplasty)  Goal: Effective Bowel Elimination  Outcome: Ongoing, Progressing     Problem: Fluid and Electrolyte Imbalance (Knee Arthroplasty)  Goal: Fluid and Electrolyte Balance  Outcome: Ongoing, Progressing     Problem: Functional Ability Impaired (Knee Arthroplasty)  Goal: Optimal Functional Ability  Outcome: Ongoing, Progressing     Problem: Infection (Knee Arthroplasty)  Goal: Absence of Infection Signs and Symptoms  Outcome: Ongoing, Progressing     Problem: Neurovascular Compromise (Knee Arthroplasty)  Goal: Intact Neurovascular Status  Outcome: Ongoing, Progressing     Problem: Ongoing Anesthesia Effects (Knee Arthroplasty)  Goal: Anesthesia/Sedation Recovery  Outcome: Ongoing, Progressing     Problem: Pain (Knee Arthroplasty)  Goal: Acceptable Pain Control  Outcome: Ongoing, Progressing     Problem: Postoperative Nausea and Vomiting (Knee Arthroplasty)  Goal: Nausea and Vomiting Relief  Outcome: Ongoing, Progressing     Problem: Postoperative Urinary Retention (Knee Arthroplasty)  Goal: Effective Urinary Elimination  Outcome: Ongoing, Progressing     Problem:  Respiratory Compromise (Knee Arthroplasty)  Goal: Effective Oxygenation and Ventilation  Outcome: Ongoing, Progressing     Problem: Hypertension Comorbidity  Goal: Blood Pressure in Desired Range  Outcome: Ongoing, Progressing

## 2023-12-01 NOTE — TRANSFER OF CARE
"Anesthesia Transfer of Care Note    Patient: Collette G Cormier    Procedure(s) Performed: Procedure(s) (LRB):  ROBOTIC ARTHROPLASTY, KNEE, TOTAL (Right)    Patient location: PACU    Anesthesia Type: general    Transport from OR: Transported from OR on room air with adequate spontaneous ventilation    Post pain: adequate analgesia    Post assessment: no apparent anesthetic complications    Post vital signs: stable    Level of consciousness: sedated, awake and responds to stimulation    Nausea/Vomiting: no nausea/vomiting    Complications: none    Transfer of care protocol was followed      Last vitals: Visit Vitals  BP (!) 148/89 (BP Location: Left arm, Patient Position: Lying)   Pulse 81   Temp 36.6 °C (97.9 °F) (Tympanic)   Resp 20   Ht 5' 2" (1.575 m)   Wt 101.7 kg (224 lb 3.3 oz)   LMP  (LMP Unknown)   SpO2 99%   Breastfeeding No   BMI 41.01 kg/m²     "

## 2023-12-01 NOTE — OP NOTE
DATE OF PROCEDURE: 12/1/2023    SURGEON:  Tomas Gordillo M.D.    ASSISTANT:MELISSA Valdez    ASSISTANT ATTESTATION: PA was necessary and essential for all aspects of the operation, including but no limited to patient positioning, surgical exposure, bony preparation, implantation, wound closure, and dressing placement.      Hospital: [facility]     PREOPERATIVE DIAGNOSIS:  Arthritis, Right knee.     POSTOPERATIVE DIAGNOSIS:  Arthritis, Right knee.     PROCEDURES PERFORMED:  Robotically-assisted right total knee arthroplasty.     ANESTHESIA: spinal    IV FLUIDS: Per Anesthesia    ESTIMATED BLOOD LOSS:  <50 cc      COUNTS:  Correct.    COMPLICATIONS:  None.    IMPLANTS:   Implant Name Type Inv. Item Serial No.  Lot No. LRB No. Used Action   PIN BONE 3.0T201ZB - [id number]  PIN BONE [id number]  [employer].  Right 1 Implanted and Explanted   PIN BONE 4 X 140MM STERILE - [id number]  PIN BONE 4 X 140MM STERILE  [employer]  Right 1 Implanted and Explanted   PATELLA TRI 29X9 X3 POLYETHYLE - [id number]  PATELLA TRI 29X9 X3 POLYETHYLE  [employer]. [id number] Right 1 Implanted   INSERT TRIATHLON TIB 10MM SZ 2 - [id number]  INSERT TRIATHLON TIB 10MM SZ 2  [employer]. [id number] Right 1 Implanted   BASEPLATE TIB RADHA PRIM SZ 2 - [id number]  BASEPLATE TIB RADHA PRIM SZ 2  [employer]. [id number] Right 1 Implanted   COMPONENT CR FEM CEMENTED 1 R - [id number]  COMPONENT CR FEM CEMENTED 1 R  [employer]. [id number] Right 1 Implanted        CONDITION: Stable to PACU.        INDICATIONS FOR PROCEDURE:    Collette G Cormier is a 58 y.o. year old female with continued pain and loss of motion of the right knee. The patient has failed conservative treatments and has been followed in my clinic. The risks, benefits, and alternatives were discussed with the patient in detail. All questions were answered. Informed consent was obtained.       PROCEDURE IN DETAIL:  Patient was found in preoperative holding by  Anesthesia and found fit for surgery. The patient was taken to the operating room and placed on the operating table in supine position. All bony prominences were well padded. Timeout was called to identify correct patient, correct procedure, and correct site, and all were in agreement. The patient underwent spinal anesthesia without complications. The patient was then prepped and draped in normal sterile fashion, leaving the right lower extremity exposed for surgery. A well-padded tourniquet was placed on right upper thigh. Approximate tourniquet time was 45 minutes at 300. The patient received preoperative antibiotics.     After exsanguination of right lower extremity, tourniquet was inflated. A 15 cm anterior incision was made over the right knee, soft tissue dissection down to the fascia, where patient had a medial parapatellar arthrotomy. The knee was then flexed and patella was everted. Remaining fat pad and meniscus were removed. The patient had severe bone-on-bone arthritis of the medial compartment and patellofemoral joint. Patient had significant varus deformity.  Patient started with a proximally 16° of varus and 15° of a flexion contracture.      Next, the 2 femoral and tibial pins were then placed to allow communication with the [employer] robotic machine. The knee was then registered with 40 trigger points both on the femoral and tibial side. Next, patient underwent soft tissue balancing, both mediolaterally in and flexion extension, evenly out to approximately 18 mm. After this was done, the [employer] robotic machine was brought in. The distal femoral cut, anterior, posterior, and chamfer cuts were then made. The proximal tibial cut was then made. Next, trialing with a 1 femur and a 2 tibia demonstrated 10 poly with full extension. The patient was able to gravity flex to 120 degrees of gravity flexion and the patella was also resurfaced with a 29 patella. After this was done, the trialing components were removed.  The bone was then copiously irrigated and dried. The actual components were then cemented into place. The knee was placed in extension. Tourniquet was released. Hemostasis was achieved. Copious irrigation was used to wash the wound. The patient's motion was 120 degrees of flexion. The patient was stable to stressing and patellofemoral tracking was appropriate.     After this was done, copious irrigation was used to wash the wound. The fascia was closed with 0 Ethibond, subcutaneous tissue closed with 2-0 Vicryl. Skin was closed with skin staples. Xeroform, 4 x 4's, soft tissue dressing, Ace wrap was placed over the right lower extremity. The patient was then awoken by Anesthesia and brought to PACU in stable condition.

## 2023-12-01 NOTE — OR NURSING
1108  procedure time out performed for rt adductor canal block, all agree  1116  started  1118  u/s guided rt adductor canal block completed, pt rajinder well, vss, resp full and regular, continuous monitoring.

## 2023-12-02 VITALS
WEIGHT: 224.19 LBS | HEIGHT: 62 IN | DIASTOLIC BLOOD PRESSURE: 85 MMHG | RESPIRATION RATE: 19 BRPM | BODY MASS INDEX: 41.26 KG/M2 | OXYGEN SATURATION: 97 % | TEMPERATURE: 98 F | HEART RATE: 64 BPM | SYSTOLIC BLOOD PRESSURE: 127 MMHG

## 2023-12-02 LAB
ANION GAP SERPL CALC-SCNC: 8 MEQ/L
BUN SERPL-MCNC: 24.1 MG/DL (ref 9.8–20.1)
CALCIUM SERPL-MCNC: 9.6 MG/DL (ref 8.4–10.2)
CHLORIDE SERPL-SCNC: 108 MMOL/L (ref 98–107)
CO2 SERPL-SCNC: 25 MMOL/L (ref 22–29)
CREAT SERPL-MCNC: 1 MG/DL (ref 0.55–1.02)
CREAT/UREA NIT SERPL: 24
ERYTHROCYTE [DISTWIDTH] IN BLOOD BY AUTOMATED COUNT: 13.6 % (ref 11.5–17)
GFR SERPLBLD CREATININE-BSD FMLA CKD-EPI: >60 MLS/MIN/1.73/M2
GLUCOSE SERPL-MCNC: 152 MG/DL (ref 74–100)
HCT VFR BLD AUTO: 35 % (ref 37–47)
HGB BLD-MCNC: 11.3 G/DL (ref 12–16)
MCH RBC QN AUTO: 26.8 PG (ref 27–31)
MCHC RBC AUTO-ENTMCNC: 32.3 G/DL (ref 33–36)
MCV RBC AUTO: 82.9 FL (ref 80–94)
NRBC BLD AUTO-RTO: 0 %
PLATELET # BLD AUTO: 262 X10(3)/MCL (ref 130–400)
PMV BLD AUTO: 9.7 FL (ref 7.4–10.4)
POTASSIUM SERPL-SCNC: 4.1 MMOL/L (ref 3.5–5.1)
RBC # BLD AUTO: 4.22 X10(6)/MCL (ref 4.2–5.4)
SODIUM SERPL-SCNC: 141 MMOL/L (ref 136–145)
WBC # SPEC AUTO: 16.01 X10(3)/MCL (ref 4.5–11.5)

## 2023-12-02 PROCEDURE — 94799 UNLISTED PULMONARY SVC/PX: CPT

## 2023-12-02 PROCEDURE — 97530 THERAPEUTIC ACTIVITIES: CPT | Mod: CQ

## 2023-12-02 PROCEDURE — 63600175 PHARM REV CODE 636 W HCPCS

## 2023-12-02 PROCEDURE — 25000003 PHARM REV CODE 250

## 2023-12-02 PROCEDURE — 85027 COMPLETE CBC AUTOMATED: CPT

## 2023-12-02 PROCEDURE — G0378 HOSPITAL OBSERVATION PER HR: HCPCS

## 2023-12-02 PROCEDURE — 97116 GAIT TRAINING THERAPY: CPT | Mod: CQ

## 2023-12-02 PROCEDURE — 80048 BASIC METABOLIC PNL TOTAL CA: CPT

## 2023-12-02 PROCEDURE — 25000003 PHARM REV CODE 250: Performed by: ORTHOPAEDIC SURGERY

## 2023-12-02 RX ORDER — POLYETHYLENE GLYCOL 3350 17 G/17G
17 POWDER, FOR SOLUTION ORAL DAILY
Qty: 14 EACH | Refills: 0
Start: 2023-12-02 | End: 2023-12-16

## 2023-12-02 RX ORDER — GABAPENTIN 300 MG/1
300 CAPSULE ORAL NIGHTLY
Qty: 14 CAPSULE | Refills: 0 | Status: SHIPPED | OUTPATIENT
Start: 2023-12-02 | End: 2023-12-02 | Stop reason: SDUPTHER

## 2023-12-02 RX ORDER — ASPIRIN 81 MG/1
81 TABLET ORAL EVERY 12 HOURS
Qty: 84 TABLET | Refills: 0
Start: 2023-12-02 | End: 2024-01-13

## 2023-12-02 RX ORDER — HYDROCODONE BITARTRATE AND ACETAMINOPHEN 10; 325 MG/1; MG/1
1 TABLET ORAL EVERY 8 HOURS PRN
Qty: 21 TABLET | Refills: 0 | Status: SHIPPED | OUTPATIENT
Start: 2023-12-02 | End: 2023-12-21 | Stop reason: SDUPTHER

## 2023-12-02 RX ORDER — METHOCARBAMOL 750 MG/1
750 TABLET, FILM COATED ORAL EVERY 6 HOURS
Qty: 56 TABLET | Refills: 0 | Status: SHIPPED | OUTPATIENT
Start: 2023-12-02 | End: 2023-12-02 | Stop reason: SDUPTHER

## 2023-12-02 RX ORDER — GABAPENTIN 300 MG/1
300 CAPSULE ORAL NIGHTLY
Qty: 7 CAPSULE | Refills: 0 | Status: SHIPPED | OUTPATIENT
Start: 2023-12-02 | End: 2023-12-09

## 2023-12-02 RX ORDER — METHOCARBAMOL 750 MG/1
750 TABLET, FILM COATED ORAL EVERY 6 HOURS
Qty: 28 TABLET | Refills: 0 | Status: SHIPPED | OUTPATIENT
Start: 2023-12-02 | End: 2023-12-09

## 2023-12-02 RX ADMIN — METOCLOPRAMIDE 10 MG: 10 TABLET ORAL at 02:12

## 2023-12-02 RX ADMIN — HYDRALAZINE HYDROCHLORIDE 25 MG: 25 TABLET, FILM COATED ORAL at 08:12

## 2023-12-02 RX ADMIN — DILTIAZEM HYDROCHLORIDE 360 MG: 180 CAPSULE, COATED, EXTENDED RELEASE ORAL at 07:12

## 2023-12-02 RX ADMIN — DOCUSATE SODIUM 200 MG: 100 CAPSULE, LIQUID FILLED ORAL at 05:12

## 2023-12-02 RX ADMIN — FAMOTIDINE 20 MG: 20 TABLET, FILM COATED ORAL at 08:12

## 2023-12-02 RX ADMIN — METHOCARBAMOL 750 MG: 750 TABLET ORAL at 05:12

## 2023-12-02 RX ADMIN — HYDROCODONE BITARTRATE AND ACETAMINOPHEN 1 TABLET: 5; 325 TABLET ORAL at 02:12

## 2023-12-02 RX ADMIN — CEFAZOLIN 2 G: 2 INJECTION, POWDER, FOR SOLUTION INTRAMUSCULAR; INTRAVENOUS at 05:12

## 2023-12-02 RX ADMIN — ANASTROZOLE 1 MG: 1 TABLET, COATED ORAL at 08:12

## 2023-12-02 RX ADMIN — METOCLOPRAMIDE 10 MG: 10 TABLET ORAL at 01:12

## 2023-12-02 RX ADMIN — SENNOSIDES AND DOCUSATE SODIUM 2 TABLET: 8.6; 5 TABLET ORAL at 08:12

## 2023-12-02 RX ADMIN — ASPIRIN 81 MG CHEWABLE TABLET 81 MG: 81 TABLET CHEWABLE at 08:12

## 2023-12-02 RX ADMIN — METOCLOPRAMIDE 10 MG: 10 TABLET ORAL at 08:12

## 2023-12-02 RX ADMIN — HYDROCODONE BITARTRATE AND ACETAMINOPHEN 1 TABLET: 10; 325 TABLET ORAL at 01:12

## 2023-12-02 RX ADMIN — LISINOPRIL 20 MG: 10 TABLET ORAL at 08:12

## 2023-12-02 RX ADMIN — HYDROCODONE BITARTRATE AND ACETAMINOPHEN 1 TABLET: 10; 325 TABLET ORAL at 08:12

## 2023-12-02 NOTE — PT/OT/SLP PROGRESS
Physical Therapy Treatment    Patient Name:  Collette G Cormier   MRN:  [id number]    Recommendations:     Discharge Recommendations: Low Intensity Therapy  Discharge Equipment Recommendations: shower chair  Barriers to discharge: None    Assessment:     Collette G Cormier is a 58 y.o. female admitted with a medical diagnosis of Primary osteoarthritis of right knee.  She presents with the following impairments/functional limitations: weakness, impaired functional mobility, decreased lower extremity function, pain, decreased ROM, edema, orthopedic precautions .    Rehab Prognosis: Good; patient would benefit from acute skilled PT services to address these deficits and reach maximum level of function.    Recent Surgery: Procedure(s) (LRB):  ROBOTIC ARTHROPLASTY, KNEE, TOTAL (Right) 1 Day Post-Op    Plan:     During this hospitalization, patient to be seen BID to address the identified rehab impairments via gait training, therapeutic activities, therapeutic exercises and progress toward the following goals:    Plan of Care Expires:       Subjective     Chief Complaint: R knee pain  Patient/Family Comments/goals: to go home  Pain/Comfort:  Pain Rating 1: 7/10  Location - Side 1: Right  Location 1: knee  Pain Addressed 1: Reposition  Pain Rating Post-Intervention 1: 4/10      Objective:     Communicated with SHAYNE Wills prior to and post session.  Patient found HOB elevated with peripheral IV upon PT entry to room.     General Precautions: Standard, fall  Orthopedic Precautions: RLE weight bearing as tolerated  Braces: N/A  Respiratory Status: Room air     Functional Mobility:  Bed Mobility:     Supine to Sit: independence  Transfers:     Sit to Stand:  modified independence with rolling walker  Gait: 200 ft with RW Mod.I  Stairs:  Pt ascended/descended 3 stair(s) with No Assistive Device with bilateral handrails with Stand-by Assistance.   Ascend and descend 4 inch curb with RW with SBA      AM-PAC 6 CLICK MOBILITY           Treatment & Education:      Patient left sitting edge of bed with all lines intact, call button in reach, and spouse present..    GOALS:   Multidisciplinary Problems       Physical Therapy Goals          Problem: Physical Therapy    Goal Priority Disciplines Outcome Goal Variances Interventions   Physical Therapy Goal     PT, PT/OT Ongoing, Progressing     Description: Pt will improve functional independence by performing:    Bed mobility: SBA  Sit to stand: SBA  Car Transfer: Min A  with rolling walker  Ambulation x 200'  feet with SBA and rolling walker  1 Step (Curb): Min A  and rolling walker  4 Steps: Min A  and RW vs HHA  right knee AROM flexion (in degrees): 0  right knee AROM extension (in degrees): 95   Independent with total knee HEP                           Time Tracking:     PT Received On:    PT Start Time: 1315     PT Stop Time: 1330  PT Total Time (min): 15 min     Billable Minutes: Gait Training 15    Treatment Type: Treatment  PT/PTA: PTA     Number of PTA visits since last PT visit: 0     12/02/2023

## 2023-12-02 NOTE — PLAN OF CARE
Problem: Adult Inpatient Plan of Care  Goal: Plan of Care Review  Outcome: Ongoing, Progressing  Goal: Patient-Specific Goal (Individualized)  Outcome: Ongoing, Progressing  Goal: Absence of Hospital-Acquired Illness or Injury  Outcome: Ongoing, Progressing  Goal: Optimal Comfort and Wellbeing  Outcome: Ongoing, Progressing  Goal: Readiness for Transition of Care  Outcome: Ongoing, Progressing     Problem: Bariatric Environmental Safety  Goal: Safety Maintained with Care  Outcome: Ongoing, Progressing     Problem: Infection  Goal: Absence of Infection Signs and Symptoms  Outcome: Ongoing, Progressing     Problem: Adjustment to Surgery (Knee Arthroplasty)  Goal: Optimal Coping  Outcome: Ongoing, Progressing     Problem: Bleeding (Knee Arthroplasty)  Goal: Absence of Bleeding  Outcome: Ongoing, Progressing     Problem: Bowel Motility Impaired (Knee Arthroplasty)  Goal: Effective Bowel Elimination  Outcome: Ongoing, Progressing     Problem: Fluid and Electrolyte Imbalance (Knee Arthroplasty)  Goal: Fluid and Electrolyte Balance  Outcome: Ongoing, Progressing     Problem: Functional Ability Impaired (Knee Arthroplasty)  Goal: Optimal Functional Ability  Outcome: Ongoing, Progressing     Problem: Infection (Knee Arthroplasty)  Goal: Absence of Infection Signs and Symptoms  Outcome: Ongoing, Progressing     Problem: Neurovascular Compromise (Knee Arthroplasty)  Goal: Intact Neurovascular Status  Outcome: Ongoing, Progressing     Problem: Ongoing Anesthesia Effects (Knee Arthroplasty)  Goal: Anesthesia/Sedation Recovery  Outcome: Ongoing, Progressing     Problem: Pain (Knee Arthroplasty)  Goal: Acceptable Pain Control  Outcome: Ongoing, Progressing     Problem: Postoperative Nausea and Vomiting (Knee Arthroplasty)  Goal: Nausea and Vomiting Relief  Outcome: Ongoing, Progressing     Problem: Postoperative Urinary Retention (Knee Arthroplasty)  Goal: Effective Urinary Elimination  Outcome: Ongoing, Progressing     Problem:  Respiratory Compromise (Knee Arthroplasty)  Goal: Effective Oxygenation and Ventilation  Outcome: Ongoing, Progressing     Problem: Hypertension Comorbidity  Goal: Blood Pressure in Desired Range  Outcome: Ongoing, Progressing     Problem: Adult Inpatient Plan of Care  Goal: Plan of Care Review  Outcome: Ongoing, Progressing  Goal: Patient-Specific Goal (Individualized)  Outcome: Ongoing, Progressing  Goal: Absence of Hospital-Acquired Illness or Injury  Outcome: Ongoing, Progressing  Goal: Optimal Comfort and Wellbeing  Outcome: Ongoing, Progressing  Goal: Readiness for Transition of Care  Outcome: Ongoing, Progressing     Problem: Bariatric Environmental Safety  Goal: Safety Maintained with Care  Outcome: Ongoing, Progressing     Problem: Infection  Goal: Absence of Infection Signs and Symptoms  Outcome: Ongoing, Progressing     Problem: Adjustment to Surgery (Knee Arthroplasty)  Goal: Optimal Coping  Outcome: Ongoing, Progressing     Problem: Bleeding (Knee Arthroplasty)  Goal: Absence of Bleeding  Outcome: Ongoing, Progressing     Problem: Bowel Motility Impaired (Knee Arthroplasty)  Goal: Effective Bowel Elimination  Outcome: Ongoing, Progressing     Problem: Fluid and Electrolyte Imbalance (Knee Arthroplasty)  Goal: Fluid and Electrolyte Balance  Outcome: Ongoing, Progressing     Problem: Functional Ability Impaired (Knee Arthroplasty)  Goal: Optimal Functional Ability  Outcome: Ongoing, Progressing     Problem: Infection (Knee Arthroplasty)  Goal: Absence of Infection Signs and Symptoms  Outcome: Ongoing, Progressing     Problem: Neurovascular Compromise (Knee Arthroplasty)  Goal: Intact Neurovascular Status  Outcome: Ongoing, Progressing     Problem: Ongoing Anesthesia Effects (Knee Arthroplasty)  Goal: Anesthesia/Sedation Recovery  Outcome: Ongoing, Progressing     Problem: Pain (Knee Arthroplasty)  Goal: Acceptable Pain Control  Outcome: Ongoing, Progressing     Problem: Postoperative Nausea and Vomiting  (Knee Arthroplasty)  Goal: Nausea and Vomiting Relief  Outcome: Ongoing, Progressing     Problem: Postoperative Urinary Retention (Knee Arthroplasty)  Goal: Effective Urinary Elimination  Outcome: Ongoing, Progressing     Problem: Respiratory Compromise (Knee Arthroplasty)  Goal: Effective Oxygenation and Ventilation  Outcome: Ongoing, Progressing     Problem: Hypertension Comorbidity  Goal: Blood Pressure in Desired Range  Outcome: Ongoing, Progressing

## 2023-12-02 NOTE — NURSING
Discharge instructions given patient verbalized understanding dressing to sx site clean dry intact. No distress noted no complaints voiced,  patient denies pain discomfort SOB. Island dressings provided for physician ordered home dressing changes

## 2023-12-02 NOTE — DISCHARGE INSTRUCTIONS
Total Knee Replacement      PAIN MEDICATIONS/PAIN MANAGEMENT:  ****Use the medication log in your discharge packet to keep track of your medications****      NO  Anti-inflammatory medications (Examples: Mobic/Meloxicam, Celebrex/Celebrex, Ibuprofen/Advil/Motrin, Naproxen/Aleve, Relafen/Nabumetone, Diclofenac/Voltaren...etc)     Norco 10/325mg (Hydrocodone/Acetaminophen) (pain pill) You can take 1/2 to 1 whole tablet every 4-6 hours for pain. If the pain is mild, take 1/2 of a pill. Once you start taking a half of a pain pill, you can take a Tylenol 325mg with each dose for a little extra pain relief without side effects. Gradually decrease the use as the pain lessens. As you decrease the Norco, increase Tylenol.    **NO MORE THAN 3000mg OF TYLENOL IN 24 HOURS**.   Robaxin/Methocarbamol 750mg (muscle relaxer)- you can take every 6-8 hours as needed for muscle spasms, thigh pain and stiffness, additional pain control or breakthrough pain medications. This medication is helpful for pain control while lessening your need for narcotics. Please reduce the use gradually as the pain and spasms lessen. DO NOT TAKE AT THE SAME TIME AS A PAIN PILL. YOU WILL BE BETTER SERVED WITH 2 HOURS BETWEEN PAIN PILL AND MUSCLE RELAXER.     Neurontin/Gabapentin 300mg (neuropathic/shocking/ nerve like pain) - take at bedtime for 2 weeks. If you get too sleepy during the day, discontinue the use completely.      **Other things that help with pain control is WALKING, COMPRESSION WRAP, ICE and ELEVATION!!**    BLOOD CLOT PREVENTION:   {Blood Thinner:48099}  You need to continuing wearing your compression stocking (PIYUSH Hose - ThromboEmbolic Disease Prevention Device) for the next 2-6 weeks post-op. It is ok to remove them for hygiene and at bedtime.   Hand wash and Dry. **If the swelling persists in the legs after you stop wearing the Piyush hose, continue to wear them until the swelling decreases.**  REMOVE STOCKINGS AT LEAST DAILY FOR SKIN  "ASSESSMENT.   Do NOT let the stockings roll down, creating a tourniquet around the back of your knee. If you need to, leave the excess at the bottom of the stocking.   The best thing you can do to prevent blood clots is to walk around as much as possible, AT LEAST EVERY 1-2 HOURS.       CONSTIPATION PREVENTION:   Miralax or Senokot S/Blank-Colace and Stool softeners EVERY DAY while on pain meds.  Use other more aggressive over the counter LAXATIVES as needed for constipation (Examples: Milk of Magnesia, Dulcolax tabs or suppository, Magnesium Citrate, Fleet's Enema...etc.)   Drink lots of water.  Increase Fiber in diet.  DO NOT GO MORE THAN 2 DAYS WITHOUT HAVING A BOWEL MOVEMENT!      ACTIVITY:  You will be FULL weight-bearing on your operative leg.  Please do not take yourself off of the walker too soon.  Please allow your outpatient therapist or surgeon to guide you.  You will be range of motion as tolerated to your operative knee.  Work on bending and straightening the knee and change positions often throughout the day.  Do not put anything behind the knee, keeping it in a bent position.  Elevate your affected extremity way above the level of the heart to reduce swelling 2 to 3 times a day, 20-30 minutes at a time.  Walk around at least every 1-2 hours while awake.  No heavy lifting, pulling, pushing or straining.  Full Range of Motion to the Knee - working on bending and straightening     THERAPY  {Therapy:05629::"Outpatient Physical Therapy-bring your prescription to the clinic of your choice as soon as possible."}    WOUND CARE:   KNEE - Dry dressing changes every other day and as needed for soiling for 14 days. Start {TIME; DAYS OF WEEK:14736}. After the dressing is changed, re-wrap with ace wrap to keep the swelling and pain down as much as possible. If the wound is draining, you may need to change the dressing daily until the incision drys up. NOTIFY YOUR SURGEON OF EXCESSIVE WOUND DRAINAGE.   DO NOT WET " WOUND or apply any ointments, creams, lotions or antiseptics.  Ace wrap - apply your compression stocking and apply the ace wrap where the stocking stops for extra added compression to the knee.   May wet incision after you follow-up with your surgeon.   Allow the Steri-strips to fall off ad torey. DO NOT TUG ON THEM OR PULL THEM ACROSS THE INCISION.   Ok to shower before then if able to keep wound from getting wet (plastic barrier, saran wrap or cling wrap and tape).   DO NOT TOUCH INCISION  Apply Ice to the right knee as much as possible.      URINARY RETENTION:  If you start having difficulty urinating, decrease the use of Pain pills and muscle relaxers and notify your primary care doctor.     PNEUMONIA PREVENTION:  Stay out of bed as much as possible and walk around every 1-2 hours.  Continue breathing exercises (Incentive Spirometry) every 1-2 hours while mobility is limited and while you are on pain pills.    INFECTION PREVENTION:  Proper handwashing before and after dressing changes. Do not wet the wound. Wound care instructions as written above. NOTIFY MD OF EXCESSIVE WOUND DRAINAGE.  No alcohol, smoking or tobacco products  Pets should not be allowed around the wound or the dressing.   Treat UTI and skin infections as soon as possible.  Pre-medicate with antibiotics prior to dental or surgical procedures.   If you are diabetic, MAINTAIN GOOD BLOOD SUGAR CONTROL (Below 150) DURING YOUR RECOVERY. If you see high numbers, notify your primary care doctor.     Call your SURGEON'S OFFICE ([phone]) if you experience the following signs and symptoms of infection:   Unusual redness, swelling, excessive, cloudy or foul smelling drainage at the incision site.   Persistent low grade temp OR a temp greater than 102 F, unrelieved by Tylenol  Pain at surgical site, unrelieved by pain meds    Warning signs of a blood clot in your leg: (CALL YOUR SURGEON)  New onset or increasing pain in calf, new onset tenderness or redness  above or below the knee or increasing swelling of your calf, ankle, or foot.  Warning signs that a blood clot has traveled to your lungs: (REPORT TO THE ER/CALL 536)  Sudden or increase in Shortness of breath, sudden onset of chest pains, or  Localized chest pain with coughing.     IF ANY ISSUES ARISE AND YOU FEEL THE NEED TO CALL YOUR PRIMARY CARE DOCTOR, PLEASE LET YOUR SURGEON KNOW AS WELL.     For emergencies, please report to OUR ([facility] or [facility] main [place]) Emergency department and tell them to call YOUR SURGEON at [phone].     BEFORE MAKING ANY CHANGES TO THE MEDICAL CARE PLAN OR GOING TO THE EMERGENCY ROOM, PLEASE CONTACT THE SURGEON.    3rd floor nursing unit # [phone]  Use this number for questions about your discharge instructions or problems filling your discharge prescriptions.   [facility] [address] [place] [address]  Phone [phone]       /      Fax [phone]  SURGEON: Dr. Gordillo    After discharge, all questions or concerns should be handled at your surgeon's office ([phone]). If it is a weekend or after hours, you will get the surgeon on call.   Discharge Medications:  PAIN MANAGEMENT: Next Dose Available   Norco 10/325mg (Hydrocodone/Acetaminophen) (Pain Med) - every 4-6 hours AS NEEDED for pain  ***   Robaxin/Methocarbamol 750mg (Muscle Relaxer) - Every 6-8 hours AS NEEDED for muscle spasms, thigh pain or additional pain control ***               COMPLICATION PREVENTION MEDS: Next Dose DUE   Aspirin 81mg twice a day for 6 weeks post-op for blood clot prevention PM on ***   MiraLAX 17gm - once or twice a day while on narcotics and muscle relaxers for constipation prevention PM on ***           EXTRAS: Next Dose Available   Gabapentin 300mg nightly x 2 weeks as needed

## 2023-12-02 NOTE — PROGRESS NOTES
"Status post R TKA. POD # 1  No acute events overnight.    Pain controlled.    Resting in bed.   Ambulating with PT.    Vital Signs  Temp: 97.5 °F (36.4 °C)  Temp Source: Oral  Pulse: 72  Heart Rate Source: Monitor  Resp: 18  SpO2: 98 %  Pulse Oximetry Type: Continuous  Flow (L/min): 2  Oxygen Concentration (%): 80  Device (Oxygen Therapy): room air  BP: 137/89  BP Location: Left arm  BP Method: Automatic  Patient Position: Lying  Arousal Level: opens eyes spontaneously  Height and Weight  Height: 5' 2" (157.5 cm)  Height Method: Stated  Weight: 101.7 kg (224 lb 3.3 oz)  Weight Method: Standard Scale  BSA (Calculated - sq m): 2.11 sq meters  BMI (Calculated): 41  Weight in (lb) to have BMI = 25: 136.4]    Lower extremity compartment soft and warm  No s/s of DVT or infection  Dressing c/d/i  NVI distally    Recent Lab Results         12/02/23  0710   12/01/23  1006        Anion Gap 8.0         BUN 24.1         BUN/CREAT RATIO 24         Calcium 9.6         Chloride 108         CO2 25         Creatinine 1.00         eGFR >60         Glucose 152         Hematocrit 35.0   38.5       Hemoglobin 11.3   12.3       MCH 26.8         MCHC 32.3         MCV 82.9         MPV 9.7         nRBC 0.0         Platelet Count 262         Potassium 4.1         RBC 4.22         RDW 13.6         Sodium 141         WBC 16.01                 A/P:    Overall patient doing well.  Therapy for mobility and ambulation.  DVT Ppx  Finish abx   Dispo pending  "

## 2023-12-02 NOTE — PT/OT/SLP PROGRESS
Physical Therapy Treatment    Patient Name:  Collette G Cormier   MRN:  [id number]    Recommendations:     Discharge Recommendations: Low Intensity Therapy  Discharge Equipment Recommendations: shower chair  Barriers to discharge: Inaccessible home - no rails for 4 steps into home; Pt reports  is supposed to be putting in railing today. Pt reports can stay at mother's house if needed.    Assessment:     Collette G Cormier is a 58 y.o. female admitted with a medical diagnosis of Primary osteoarthritis of right knee.  She presents with the following impairments/functional limitations: weakness, impaired functional mobility, gait instability, decreased lower extremity function, pain, decreased ROM, edema .    Rehab Prognosis: Good; patient would benefit from acute skilled PT services to address these deficits and reach maximum level of function.    Recent Surgery: Procedure(s) (LRB):  ROBOTIC ARTHROPLASTY, KNEE, TOTAL (Right) 1 Day Post-Op    Plan:     During this hospitalization, patient to be seen BID to address the identified rehab impairments via gait training, therapeutic activities, therapeutic exercises and progress toward the following goals:    Plan of Care Expires:       Subjective     Chief Complaint: R knee pain  Patient/Family Comments/goals: to go home  Pain/Comfort:  Pain Rating 1: 6/10  Location - Side 1: Right  Location 1: knee  Pain Addressed 1: Reposition, Cessation of Activity  Pain Rating Post-Intervention 1: 4/10      Objective:     Communicated with SHAYNE Wills prior to and post session.  Patient found HOB elevated with peripheral IV, SCD upon PT entry to room.     General Precautions: Standard, fall  Orthopedic Precautions: RLE weight bearing as tolerated  Braces: N/A  Respiratory Status: Room air     Functional Mobility:  Bed Mobility:     Supine to Sit: modified independence  Sit to Supine: modified independence  Transfers:     Sit to Stand:  modified independence with rolling walker  Car  "Transfer: supervision with  rolling walker  using  Step Transfer  Gait: 200 ft with RW with SBA  Stairs:  Pt ascended/descended 4 stair(s) and 4" curb step with Rolling Walker; Straight Cane, and HHA with right handrail used with stairs with Minimal Assistance with step-to pattern      AM-PAC 6 CLICK MOBILITY          Treatment & Education:    (In degrees) AROM PROM   R knee flexion 85    R knee extension 3         Patient left HOB elevated with all lines intact, call button in reach, and spouse present..    GOALS:   Multidisciplinary Problems       Physical Therapy Goals          Problem: Physical Therapy    Goal Priority Disciplines Outcome Goal Variances Interventions   Physical Therapy Goal     PT, PT/OT Ongoing, Progressing     Description: Pt will improve functional independence by performing:    Bed mobility: SBA  Sit to stand: SBA  Car Transfer: Min A  with rolling walker  Ambulation x 200'  feet with SBA and rolling walker  1 Step (Curb): Min A  and rolling walker  4 Steps: Min A  and RW vs HHA  right knee AROM flexion (in degrees): 0  right knee AROM extension (in degrees): 95   Independent with total knee HEP                           Time Tracking:     PT Received On:    PT Start Time: 0940     PT Stop Time: 1010  PT Total Time (min): 30 min     Billable Minutes: Gait Training 15 and Therapeutic Activity 15    Treatment Type: Treatment  PT/PTA: PTA     Number of PTA visits since last PT visit: 0     12/02/2023  "

## 2023-12-05 NOTE — DISCHARGE SUMMARY
ADMIT DATE: 12/1/2023    DISCHARGE DATE: 12/2/2023  2:50 PM     DIAGNOSIS:  End-stage osteoarthritis, right knee.      OPERATIVE PROCEDURE:  Right total knee arthroplasty.      HOSPITAL COURSE: Collette G Cormier is a 58 y.o.  old female  who was admitted on the above date for the above procedure.  The patient tolerated surgery well, was back up to the orthopedic floor in stable condition.  Postoperatively, the patient is eating well, having normal bowel movements.  Pain is well controlled with p.o. pain medicine.  The patient has been followed by physical therapy, goals met.      PHYSICAL EXAM:  GENERAL:  The patient is a well nourished, well developed female.  The patient is awake, alert and oriented x3 and in no apparent distress.  The patient is pleasant and cooperative.   EXTREMITIES:  Examination of the right lower extremity compartments soft and warm.  Skin is intact.  No signs or symptoms of DVT or infection.  The incision is clean, dry, and intact.  The patient's motion is 0-100 degrees.  Stable to stressing and neurovascularly intact distally.      DISPOSITION:  Home with outpatient physical therapy.      FOLLOWUP:     Follow-up Information       Tomas Gordillo MD. Go on 12/14/2023.    Specialty: Orthopedic Surgery  Why: Ortho follow up appt on Thursday 12/14/23 @ 10:30am.  Contact information:  [address]  [address]  [place] [address]  [phone]               [place], [facility] - Follow up.    Why: This is the outpatient therapy facility. They will contact pt with appt date & time. Call if you have questions or concerns.  Contact information:  [address] [place] [address]  [phone]                                 MEDICATIONS:    Reconciled Home Medications:      Medication List        START taking these medications      aspirin 81 MG EC tablet  Commonly known as: ECOTRIN  Take 1 tablet (81 mg total) by mouth every 12 (twelve) hours.     gabapentin 300 MG  capsule  Commonly known as: NEURONTIN  Take 1 capsule (300 mg total) by mouth every evening. for 7 days     HYDROcodone-acetaminophen  mg per tablet  Commonly known as: NORCO  Take 1 tablet by mouth every 8 (eight) hours as needed for Pain.     methocarbamoL 750 MG Tab  Commonly known as: ROBAXIN  Take 1 tablet (750 mg total) by mouth every 6 (six) hours. for 7 days     polyethylene glycol 17 gram Pwpk  Commonly known as: GLYCOLAX  Take 17 g by mouth once daily. for 14 days            CONTINUE taking these medications      anastrozole 1 mg Tab  Commonly known as: ARIMIDEX  Take 1 mg by mouth once daily.     benazepriL 20 MG tablet  Commonly known as: LOTENSIN  Take 20 mg by mouth once daily.     * DECARA 1,250 mcg (50,000 unit) capsule  Generic drug: cholecalciferol (vitamin D3)  Take 50,000 Units by mouth every 7 days.     * cholecalciferol (vitamin D3) 50 mcg (2,000 unit) Tab  Commonly known as: VITAMIN D3  1 tablet Orally Once a day     diltiaZEM 360 MG 24 hr capsule  Commonly known as: CARDIZEM CD  Take 360 mg by mouth every morning.     fluticasone propionate 50 mcg/actuation nasal spray  Commonly known as: FLONASE  1 spray by Each Nostril route as needed.     hydrALAZINE 25 MG tablet  Commonly known as: APRESOLINE  Take 25 mg by mouth 2 (two) times daily. Takes 1 1/2 tablets twice daily     isosorbide-hydrALAZINE 20-37.5 mg 20-37.5 mg Tab  Commonly known as: BIDIL  Oral for 90     mometasone 50 mcg/actuation nasal spray  Commonly known as: NASONEX  by Nasal route as needed.     WEGOVY 0.25 mg/0.5 mL Pnij  Generic drug: semaglutide (weight loss)  Inject 0.5 mLs into the skin every 7 days.           * This list has 2 medication(s) that are the same as other medications prescribed for you. Read the directions carefully, and ask your doctor or other care provider to review them with you.                     DISCHARGE INSTRUCTIONS:  Patient instructed to remain weightbearing as tolerated to the right lower  extremity.  Call or return to the emergency room immediately if any worsening conditions.  Patient voiced understanding.

## 2023-12-07 LAB — VIEW PATHOLOGY REPORT (RELIAPATH): NORMAL

## 2023-12-14 ENCOUNTER — OFFICE VISIT (OUTPATIENT)
Dept: ORTHOPEDICS | Facility: CLINIC | Age: 58
End: 2023-12-14
Payer: COMMERCIAL

## 2023-12-14 ENCOUNTER — HOSPITAL ENCOUNTER (OUTPATIENT)
Dept: RADIOLOGY | Facility: CLINIC | Age: 58
Discharge: HOME | End: 2023-12-14
Attending: ORTHOPAEDIC SURGERY
Payer: COMMERCIAL

## 2023-12-14 VITALS
WEIGHT: 224 LBS | DIASTOLIC BLOOD PRESSURE: 89 MMHG | BODY MASS INDEX: 41.22 KG/M2 | SYSTOLIC BLOOD PRESSURE: 149 MMHG | HEIGHT: 62 IN | HEART RATE: 93 BPM

## 2023-12-14 DIAGNOSIS — Z96.651 HISTORY OF TOTAL KNEE REPLACEMENT, RIGHT: Primary | ICD-10-CM

## 2023-12-14 DIAGNOSIS — Z96.651 HISTORY OF TOTAL KNEE REPLACEMENT, RIGHT: ICD-10-CM

## 2023-12-14 PROCEDURE — 1160F RVW MEDS BY RX/DR IN RCRD: CPT | Mod: CPTII,,, | Performed by: ORTHOPAEDIC SURGERY

## 2023-12-14 PROCEDURE — 3077F PR MOST RECENT SYSTOLIC BLOOD PRESSURE >= 140 MM HG: ICD-10-PCS | Mod: CPTII,,, | Performed by: ORTHOPAEDIC SURGERY

## 2023-12-14 PROCEDURE — 3079F PR MOST RECENT DIASTOLIC BLOOD PRESSURE 80-89 MM HG: ICD-10-PCS | Mod: CPTII,,, | Performed by: ORTHOPAEDIC SURGERY

## 2023-12-14 PROCEDURE — 73562 XR KNEE 3 VIEW RIGHT: ICD-10-PCS | Mod: RT,,, | Performed by: ORTHOPAEDIC SURGERY

## 2023-12-14 PROCEDURE — 1159F MED LIST DOCD IN RCRD: CPT | Mod: CPTII,,, | Performed by: ORTHOPAEDIC SURGERY

## 2023-12-14 PROCEDURE — 99024 POSTOP FOLLOW-UP VISIT: CPT | Mod: ,,, | Performed by: ORTHOPAEDIC SURGERY

## 2023-12-14 PROCEDURE — 4010F ACE/ARB THERAPY RXD/TAKEN: CPT | Mod: CPTII,,, | Performed by: ORTHOPAEDIC SURGERY

## 2023-12-14 PROCEDURE — 73562 X-RAY EXAM OF KNEE 3: CPT | Mod: RT,,, | Performed by: ORTHOPAEDIC SURGERY

## 2023-12-14 PROCEDURE — 3044F HG A1C LEVEL LT 7.0%: CPT | Mod: CPTII,,, | Performed by: ORTHOPAEDIC SURGERY

## 2023-12-14 PROCEDURE — 99024 PR POST-OP FOLLOW-UP VISIT: ICD-10-PCS | Mod: ,,, | Performed by: ORTHOPAEDIC SURGERY

## 2023-12-14 PROCEDURE — 3044F PR MOST RECENT HEMOGLOBIN A1C LEVEL <7.0%: ICD-10-PCS | Mod: CPTII,,, | Performed by: ORTHOPAEDIC SURGERY

## 2023-12-14 PROCEDURE — 3077F SYST BP >= 140 MM HG: CPT | Mod: CPTII,,, | Performed by: ORTHOPAEDIC SURGERY

## 2023-12-14 PROCEDURE — 1159F PR MEDICATION LIST DOCUMENTED IN MEDICAL RECORD: ICD-10-PCS | Mod: CPTII,,, | Performed by: ORTHOPAEDIC SURGERY

## 2023-12-14 PROCEDURE — 4010F PR ACE/ARB THEARPY RXD/TAKEN: ICD-10-PCS | Mod: CPTII,,, | Performed by: ORTHOPAEDIC SURGERY

## 2023-12-14 PROCEDURE — 1160F PR REVIEW ALL MEDS BY PRESCRIBER/CLIN PHARMACIST DOCUMENTED: ICD-10-PCS | Mod: CPTII,,, | Performed by: ORTHOPAEDIC SURGERY

## 2023-12-14 PROCEDURE — 3079F DIAST BP 80-89 MM HG: CPT | Mod: CPTII,,, | Performed by: ORTHOPAEDIC SURGERY

## 2023-12-14 RX ORDER — SULFAMETHOXAZOLE AND TRIMETHOPRIM 800; 160 MG/1; MG/1
1 TABLET ORAL 2 TIMES DAILY
Qty: 14 TABLET | Refills: 0 | Status: SHIPPED | OUTPATIENT
Start: 2023-12-14 | End: 2023-12-14

## 2023-12-14 RX ORDER — SULFAMETHOXAZOLE AND TRIMETHOPRIM 800; 160 MG/1; MG/1
1 TABLET ORAL 2 TIMES DAILY
Qty: 14 TABLET | Refills: 0 | Status: SHIPPED | OUTPATIENT
Start: 2023-12-14 | End: 2023-12-15 | Stop reason: SDUPTHER

## 2023-12-14 NOTE — PROGRESS NOTES
"Subjective:    CC: Post-op Evaluation (Post Op Right TKA sx 12/1/23 GL 2/29/24, patient states over all she's doing pretty good goes to PT 3x a week at [facility], does have some warmth, redness and drainage on her bottom incisions )       HPI:  Patient comes in today for her 1st postop visit.  She is 2 weeks from her right total knee arthroplasty she is presently with family.  Patient states she is doing very well, she can already feel a difference.    ROS: Refer to HPI for pertinent ROS. All other 12 point systems negative.    Objective:  Vitals:    12/14/23 1034   BP: (!) 149/89   BP Location: Left forearm   Patient Position: Sitting   Pulse: 93   Weight: 101.6 kg (224 lb)   Height: 5' 2" (1.575 m)        Physical Exam:  Right lower extremity compartment soft and warm.  Skin is intact.  There is no signs symptoms of DVT or infection.  Her anterior incision is well healed the 2 distal pin sites have some slight drainage after the staple removal.  There is some surrounding erythema of the skin likely irritation.  Her knee motion is 0-100 degrees she is stable to stressing, neurovascular intact distally.    Images:  X-rays three views right knee demonstrate a well-aligned total knee arthroplasty. Images Reviewed and discussed with patient.    Assessment:  1. History of total knee replacement, right  - X-Ray Knee 3 View Right; Future  - sulfamethoxazole-trimethoprim 800-160mg (BACTRIM DS) 800-160 mg Tab; Take 1 tablet by mouth 2 (two) times daily.  Dispense: 14 tablet; Refill: 0        Plan:  At this time we discussed her physical exam and x-ray findings.  She will continue good hygiene habits, continue physical therapy, as a over precaution, she will be placed on antibiotics, like see back next week to see how she is progressing.    Follow UP: No follow-ups on file.              "

## 2023-12-15 ENCOUNTER — TELEPHONE (OUTPATIENT)
Dept: ORTHOPEDICS | Facility: CLINIC | Age: 58
End: 2023-12-15
Payer: COMMERCIAL

## 2023-12-15 DIAGNOSIS — Z96.651 HISTORY OF TOTAL KNEE REPLACEMENT, RIGHT: ICD-10-CM

## 2023-12-15 RX ORDER — SULFAMETHOXAZOLE AND TRIMETHOPRIM 800; 160 MG/1; MG/1
1 TABLET ORAL 2 TIMES DAILY
Qty: 14 TABLET | Refills: 0 | Status: SHIPPED | OUTPATIENT
Start: 2023-12-15

## 2023-12-15 NOTE — TELEPHONE ENCOUNTER
Patient called stating that the [facility] in [place] did not have her antibiotics. Sent rx to [facility] in [place].

## 2023-12-21 ENCOUNTER — OFFICE VISIT (OUTPATIENT)
Dept: ORTHOPEDICS | Facility: CLINIC | Age: 58
End: 2023-12-21
Payer: COMMERCIAL

## 2023-12-21 VITALS
DIASTOLIC BLOOD PRESSURE: 68 MMHG | HEIGHT: 62 IN | BODY MASS INDEX: 40.97 KG/M2 | HEART RATE: 105 BPM | SYSTOLIC BLOOD PRESSURE: 123 MMHG

## 2023-12-21 DIAGNOSIS — Z96.651 HISTORY OF TOTAL KNEE REPLACEMENT, RIGHT: Primary | ICD-10-CM

## 2023-12-21 PROCEDURE — 4010F ACE/ARB THERAPY RXD/TAKEN: CPT | Mod: CPTII,,, | Performed by: ORTHOPAEDIC SURGERY

## 2023-12-21 PROCEDURE — 4010F PR ACE/ARB THEARPY RXD/TAKEN: ICD-10-PCS | Mod: CPTII,,, | Performed by: ORTHOPAEDIC SURGERY

## 2023-12-21 PROCEDURE — 1159F MED LIST DOCD IN RCRD: CPT | Mod: CPTII,,, | Performed by: ORTHOPAEDIC SURGERY

## 2023-12-21 PROCEDURE — 1160F RVW MEDS BY RX/DR IN RCRD: CPT | Mod: CPTII,,, | Performed by: ORTHOPAEDIC SURGERY

## 2023-12-21 PROCEDURE — 99024 PR POST-OP FOLLOW-UP VISIT: ICD-10-PCS | Mod: ,,, | Performed by: ORTHOPAEDIC SURGERY

## 2023-12-21 PROCEDURE — 1159F PR MEDICATION LIST DOCUMENTED IN MEDICAL RECORD: ICD-10-PCS | Mod: CPTII,,, | Performed by: ORTHOPAEDIC SURGERY

## 2023-12-21 PROCEDURE — 99024 POSTOP FOLLOW-UP VISIT: CPT | Mod: ,,, | Performed by: ORTHOPAEDIC SURGERY

## 2023-12-21 PROCEDURE — 3078F DIAST BP <80 MM HG: CPT | Mod: CPTII,,, | Performed by: ORTHOPAEDIC SURGERY

## 2023-12-21 PROCEDURE — 3044F PR MOST RECENT HEMOGLOBIN A1C LEVEL <7.0%: ICD-10-PCS | Mod: CPTII,,, | Performed by: ORTHOPAEDIC SURGERY

## 2023-12-21 PROCEDURE — 3074F SYST BP LT 130 MM HG: CPT | Mod: CPTII,,, | Performed by: ORTHOPAEDIC SURGERY

## 2023-12-21 PROCEDURE — 1160F PR REVIEW ALL MEDS BY PRESCRIBER/CLIN PHARMACIST DOCUMENTED: ICD-10-PCS | Mod: CPTII,,, | Performed by: ORTHOPAEDIC SURGERY

## 2023-12-21 PROCEDURE — 3074F PR MOST RECENT SYSTOLIC BLOOD PRESSURE < 130 MM HG: ICD-10-PCS | Mod: CPTII,,, | Performed by: ORTHOPAEDIC SURGERY

## 2023-12-21 PROCEDURE — 3044F HG A1C LEVEL LT 7.0%: CPT | Mod: CPTII,,, | Performed by: ORTHOPAEDIC SURGERY

## 2023-12-21 PROCEDURE — 3078F PR MOST RECENT DIASTOLIC BLOOD PRESSURE < 80 MM HG: ICD-10-PCS | Mod: CPTII,,, | Performed by: ORTHOPAEDIC SURGERY

## 2023-12-21 RX ORDER — HYDROCODONE BITARTRATE AND ACETAMINOPHEN 10; 325 MG/1; MG/1
1 TABLET ORAL EVERY 8 HOURS PRN
Qty: 21 TABLET | Refills: 0 | Status: SHIPPED | OUTPATIENT
Start: 2023-12-21

## 2023-12-21 RX ORDER — HYDROCODONE BITARTRATE AND ACETAMINOPHEN 10; 325 MG/1; MG/1
1 TABLET ORAL EVERY 8 HOURS PRN
Qty: 21 TABLET | Refills: 0 | OUTPATIENT
Start: 2023-12-21

## 2023-12-21 NOTE — PROGRESS NOTES
"Subjective:    CC: Follow-up (Right TKA sx 12/1/23 GL 2/29/24, patient is in a lot of pain today states her pain medication was never filled when she called last week)       HPI:  Patient returns today for repeat exam.  Patient is 3 weeks from right total knee arthroplasty.  She is here for recheck of her tibia pin sites.  She states her knee is doing very well she has been going to physical therapy.    ROS: Refer to [facility] for pertinent ROS. All other 12 point systems negative.    Objective:  Vitals:    12/21/23 1041   BP: 123/68   BP Location: Left forearm   Patient Position: Sitting   Pulse: 105   Height: 5' 2" (1.575 m)        Physical Exam:  Right lower extremity compartment soft and warm.  Skin is intact.  There is no signs symptoms of DVT or infection.  Her knee incision is healing nicely.  There is no swelling there is no erythema there is no joint effusion.  Her motion is 0-100 degrees she is stable to stressing.  Examination of the distal tibia pin sites, she has some slight ooze out of 1 of the pin site.  Her previous incision is aggravated.  There is no fluctuance, she is neurovascular intact distally.    Images: . Images Reviewed and discussed with patient.    Assessment:  1. History of total knee replacement, right        Plan:  At this time we have discussed her physical exam and intraoperative findings.  She will continue physical therapy for her right knee.  I would like see back next week for wound check of her distal tibia pin site.  She will continue good hygiene habits.    Follow UP: No follow-ups on file.              "

## 2023-12-27 ENCOUNTER — TELEPHONE (OUTPATIENT)
Dept: ORTHOPEDICS | Facility: CLINIC | Age: 58
End: 2023-12-27
Payer: COMMERCIAL

## 2023-12-29 ENCOUNTER — OFFICE VISIT (OUTPATIENT)
Dept: ORTHOPEDICS | Facility: CLINIC | Age: 58
End: 2023-12-29
Payer: COMMERCIAL

## 2023-12-29 VITALS — WEIGHT: 224 LBS | HEIGHT: 62 IN | BODY MASS INDEX: 41.22 KG/M2

## 2023-12-29 DIAGNOSIS — Z96.651 HISTORY OF TOTAL KNEE REPLACEMENT, RIGHT: Primary | ICD-10-CM

## 2023-12-29 PROCEDURE — 4010F PR ACE/ARB THEARPY RXD/TAKEN: ICD-10-PCS | Mod: CPTII,,,

## 2023-12-29 PROCEDURE — 3044F HG A1C LEVEL LT 7.0%: CPT | Mod: CPTII,,,

## 2023-12-29 PROCEDURE — 1159F MED LIST DOCD IN RCRD: CPT | Mod: CPTII,,,

## 2023-12-29 PROCEDURE — 4010F ACE/ARB THERAPY RXD/TAKEN: CPT | Mod: CPTII,,,

## 2023-12-29 PROCEDURE — 3044F PR MOST RECENT HEMOGLOBIN A1C LEVEL <7.0%: ICD-10-PCS | Mod: CPTII,,,

## 2023-12-29 PROCEDURE — 99024 PR POST-OP FOLLOW-UP VISIT: ICD-10-PCS | Mod: ,,,

## 2023-12-29 PROCEDURE — 1160F PR REVIEW ALL MEDS BY PRESCRIBER/CLIN PHARMACIST DOCUMENTED: ICD-10-PCS | Mod: CPTII,,,

## 2023-12-29 PROCEDURE — 1160F RVW MEDS BY RX/DR IN RCRD: CPT | Mod: CPTII,,,

## 2023-12-29 PROCEDURE — 99024 POSTOP FOLLOW-UP VISIT: CPT | Mod: ,,,

## 2023-12-29 PROCEDURE — 1159F PR MEDICATION LIST DOCUMENTED IN MEDICAL RECORD: ICD-10-PCS | Mod: CPTII,,,

## 2023-12-29 NOTE — PROGRESS NOTES
Subjective:    CC: Follow-up of the Right Knee (RTKA 12/1/23 - 2/19/24 - distal wound check - pt states that she is doing much better. Ambulating with walker.)       HPI:  Patient presents to clinic for tibia site wound check.  Status post right TKA on 12/01/2023.  She is approximately 4 weeks out.  She states that she is doing much better.  She has no longer having any drainage.  She has formed an eschar over her distal tibia site.  She denies any knee pain.  Ambulating with a walker.  She still attending physical therapy.  No new complaints.    ROS: Refer to HPI for pertinent ROS. All other 12 point systems negative.    Objective:    There were no vitals filed for this visit.     Physical Exam:  Right lower extremity compartments are soft and warm.  Skin is intact.  There are no signs or symptoms of a DVT or infection.  Her incisions are well healed.  To be a site with a well healing eschar in place.  No erythema, fluctuance or drainage.  Right knee range of motion 0-110 degrees.  Patella tracking appropriately.  Neurovascularly intact distally.    Images:  Previous Images Reviewed and discussed with patient.    Assessment:  1. History of total knee replacement, right       Plan:  Physical exam and previous imaging findings discussed with patient.  Wound care instructions given.  She will continue formal physical therapy.  We have discussed pain medication as needed with appropriate precautions; no refill needed today.  I would like to see the patient back in 2 weeks for her 6 week postop appointment.    Follow up: Follow up in about 2 weeks (around 1/12/2024).

## 2024-01-18 ENCOUNTER — OFFICE VISIT (OUTPATIENT)
Dept: ORTHOPEDICS | Facility: CLINIC | Age: 59
End: 2024-01-18
Payer: COMMERCIAL

## 2024-01-18 VITALS — HEIGHT: 62 IN | WEIGHT: 224 LBS | BODY MASS INDEX: 41.22 KG/M2

## 2024-01-18 DIAGNOSIS — Z96.651 HISTORY OF TOTAL KNEE REPLACEMENT, RIGHT: Primary | ICD-10-CM

## 2024-01-18 PROCEDURE — 1159F MED LIST DOCD IN RCRD: CPT | Mod: CPTII,,, | Performed by: ORTHOPAEDIC SURGERY

## 2024-01-18 PROCEDURE — 1160F RVW MEDS BY RX/DR IN RCRD: CPT | Mod: CPTII,,, | Performed by: ORTHOPAEDIC SURGERY

## 2024-01-18 PROCEDURE — 99024 POSTOP FOLLOW-UP VISIT: CPT | Mod: ,,, | Performed by: ORTHOPAEDIC SURGERY

## 2024-01-18 NOTE — PROGRESS NOTES
"Subjective:    CC: Follow-up of the Right Knee (R TKA 12/1/23 - 2/29/24 - pt states that her knee is doing much better. She states that her pain is much better. )       HPI:  Returns today for repeat exam.  Patient states her wound has healed nicely over the inferior pin sites.  She has no complaints.  She states her pain and motion continues to improve.  She has been going to physical therapy.    ROS: Refer to [facility] for pertinent ROS. All other 12 point systems negative.    Objective:  Vitals:    01/18/24 1341   Weight: 101.6 kg (224 lb)   Height: 5' 2" (1.575 m)        Physical Exam:  Right lower extremity compartment soft and warm.  Skin is intact.  There is no signs symptoms of DVT or infection.  Her incision well healed there is no swelling there is no erythema there is no joint effusion.  Her motion is 0-112 degrees she is stable to stressing, neurovascular intact distally.    Images: . Images Reviewed and discussed with patient.    Assessment:  1. History of total knee replacement, right        Plan:  At this time we discussed her physical exam and intraoperative findings.  Patient is improving nicely she will continue physical therapy to regain her full strength and motion.  I would like see back in 4 weeks to see how she is progressing.    Follow UP: No follow-ups on file.              "

## 2024-01-23 ENCOUNTER — TELEPHONE (OUTPATIENT)
Dept: ORTHOPEDICS | Facility: CLINIC | Age: 59
End: 2024-01-23
Payer: COMMERCIAL

## 2024-02-22 ENCOUNTER — OFFICE VISIT (OUTPATIENT)
Dept: ORTHOPEDICS | Facility: CLINIC | Age: 59
End: 2024-02-22
Payer: COMMERCIAL

## 2024-02-22 VITALS
HEART RATE: 78 BPM | BODY MASS INDEX: 41.22 KG/M2 | DIASTOLIC BLOOD PRESSURE: 92 MMHG | HEIGHT: 62 IN | SYSTOLIC BLOOD PRESSURE: 143 MMHG | WEIGHT: 224 LBS

## 2024-02-22 DIAGNOSIS — Z96.651 HISTORY OF TOTAL KNEE REPLACEMENT, RIGHT: Primary | ICD-10-CM

## 2024-02-22 PROCEDURE — 99024 POSTOP FOLLOW-UP VISIT: CPT | Mod: ,,,

## 2024-02-22 PROCEDURE — 3077F SYST BP >= 140 MM HG: CPT | Mod: CPTII,,,

## 2024-02-22 PROCEDURE — 4010F ACE/ARB THERAPY RXD/TAKEN: CPT | Mod: CPTII,,,

## 2024-02-22 PROCEDURE — 1159F MED LIST DOCD IN RCRD: CPT | Mod: CPTII,,,

## 2024-02-22 PROCEDURE — 3080F DIAST BP >= 90 MM HG: CPT | Mod: CPTII,,,

## 2024-02-22 NOTE — PROGRESS NOTES
Subjective:    CC: Follow-up (11 wk s/p R TKA 12/01/23 - 02/29/24 states she has been doing good. States her knee has been feeling better. PT 2 times a week. )       HPI:  Patient returns to clinic for follow up of a right total knee arthroplasty 12/01/2020.  She is almost 3 months out. The patient states she is doing very well.  She is approximately 4 more weeks of physical therapy to work on gaining strength otherwise range of motion is good.  She denies any problems or complaints about the right knee. Denies signs of infection. The patient is working with a physical therapist and making good progress. Ambulating unassisted. No new complaints.    ROS: Refer to HPI for pertinent ROS. All other 12 point systems negative.    Objective:    Vitals:    02/22/24 1450   BP: (!) 143/92   Pulse: 78        Physical Exam:  Right lower extremity compartments are soft and warm.  There are no signs or symptoms of DVT or infection. Incisions are well-healed.  Knee ROM is 0-100 degrees today.  The patient is non tender to palpation. The patella is tracking appropriately. The knee is stable to stressing. Neurovascularly intact distally.          Images:  previous Images Reviewed and discussed with patient.    Assessment:  1. History of total knee replacement, right       Plan:  Physical exam, previous x-rays, and intraoperative findings were discussed with the patient.  She is done very nicely in regards to her right knee.  She will finish out formal PT on the right knee to work on gaining full strength.  We have discussed her work duties; she will remain off duty this time to be able to make her PT appointments.  The Patient was instructed to take OTC pain medication as needed with appropriate precautions.  Patient states that her left knee is also at the point now that she would like to proceed with a left TKA.  Tentative date of 03/26/2024 set.  I would like to see the patient back in one week to preop for a left TKA.    Follow  up: Follow up in about 1 week (around 2/29/2024).

## 2024-02-29 ENCOUNTER — OFFICE VISIT (OUTPATIENT)
Dept: ORTHOPEDICS | Facility: CLINIC | Age: 59
End: 2024-02-29
Payer: COMMERCIAL

## 2024-02-29 ENCOUNTER — HOSPITAL ENCOUNTER (OUTPATIENT)
Dept: RADIOLOGY | Facility: HOSPITAL | Age: 59
Discharge: HOME | End: 2024-02-29
Payer: COMMERCIAL

## 2024-02-29 ENCOUNTER — HOSPITAL ENCOUNTER (OUTPATIENT)
Dept: RADIOLOGY | Facility: CLINIC | Age: 59
Discharge: HOME | End: 2024-02-29
Payer: COMMERCIAL

## 2024-02-29 VITALS
HEIGHT: 62 IN | HEART RATE: 84 BPM | WEIGHT: 216.19 LBS | SYSTOLIC BLOOD PRESSURE: 143 MMHG | DIASTOLIC BLOOD PRESSURE: 88 MMHG | BODY MASS INDEX: 39.78 KG/M2

## 2024-02-29 DIAGNOSIS — M17.12 OSTEOARTHRITIS OF LEFT KNEE, UNSPECIFIED OSTEOARTHRITIS TYPE: ICD-10-CM

## 2024-02-29 DIAGNOSIS — Z01.818 PRE-OP TESTING: ICD-10-CM

## 2024-02-29 DIAGNOSIS — M17.12 OSTEOARTHRITIS OF LEFT KNEE, UNSPECIFIED OSTEOARTHRITIS TYPE: Primary | ICD-10-CM

## 2024-02-29 LAB — MRSA PCR SCRN (OHS): NOT DETECTED

## 2024-02-29 PROCEDURE — 1159F MED LIST DOCD IN RCRD: CPT | Mod: CPTII,,,

## 2024-02-29 PROCEDURE — 1160F RVW MEDS BY RX/DR IN RCRD: CPT | Mod: CPTII,,,

## 2024-02-29 PROCEDURE — 3077F SYST BP >= 140 MM HG: CPT | Mod: CPTII,,,

## 2024-02-29 PROCEDURE — 4010F ACE/ARB THERAPY RXD/TAKEN: CPT | Mod: CPTII,,,

## 2024-02-29 PROCEDURE — 3008F BODY MASS INDEX DOCD: CPT | Mod: CPTII,,,

## 2024-02-29 PROCEDURE — 73564 X-RAY EXAM KNEE 4 OR MORE: CPT | Mod: LT,,,

## 2024-02-29 PROCEDURE — 3079F DIAST BP 80-89 MM HG: CPT | Mod: CPTII,,,

## 2024-02-29 PROCEDURE — 99214 OFFICE O/P EST MOD 30 MIN: CPT | Mod: POP,,,

## 2024-02-29 PROCEDURE — 71046 X-RAY EXAM CHEST 2 VIEWS: CPT | Mod: TC

## 2024-02-29 RX ORDER — GABAPENTIN 100 MG/1
300 CAPSULE ORAL
Status: CANCELLED | OUTPATIENT
Start: 2024-02-29

## 2024-02-29 RX ORDER — ACETAMINOPHEN 500 MG
1000 TABLET ORAL
Status: CANCELLED | OUTPATIENT
Start: 2024-02-29

## 2024-02-29 RX ORDER — SODIUM CHLORIDE, SODIUM GLUCONATE, SODIUM ACETATE, POTASSIUM CHLORIDE AND MAGNESIUM CHLORIDE 30; 37; 368; 526; 502 MG/100ML; MG/100ML; MG/100ML; MG/100ML; MG/100ML
INJECTION, SOLUTION INTRAVENOUS CONTINUOUS
Status: CANCELLED | OUTPATIENT
Start: 2024-02-29

## 2024-02-29 RX ORDER — ONDANSETRON 4 MG/1
4 TABLET, ORALLY DISINTEGRATING ORAL
Status: CANCELLED | OUTPATIENT
Start: 2024-02-29

## 2024-02-29 RX ORDER — SCOLOPAMINE TRANSDERMAL SYSTEM 1 MG/1
1 PATCH, EXTENDED RELEASE TRANSDERMAL ONCE AS NEEDED
Status: CANCELLED | OUTPATIENT
Start: 2024-02-29 | End: 2035-07-28

## 2024-02-29 RX ORDER — TRANEXAMIC ACID 650 MG/1
1950 TABLET ORAL
Status: CANCELLED | OUTPATIENT
Start: 2024-02-29 | End: 2024-02-29

## 2024-02-29 NOTE — LETTER
[facility]  [address] [place] [address] [address]  [place], [address]  Phone: [phone]  Fax: [phone]    Name:Collette G Cormier  :1965   Date:2024     PATIENT IS UNABLE TO WORK AS OF: 2024  [_] Pending treatment.  [_] For approximately [_] Days [_] Weeks [_] Months  [_] Pending diagnostic testing.  [xxx] Pending surgical treatment.  [_] For approximately _ months (Post Surgery)    PATIENT IS ABLE TO RETURN TO WORK AS OF: re-eval at next appt on 2024      COMMENTS     MELISSA Valdez

## 2024-02-29 NOTE — H&P
Admission History & Physical    Subjective:    CC: Pre-op Exam of the Left Knee (Preop L TKA - pt is ready for her surgery. She does have a lot of pain in her knee. )       HPI:  Collette G Cormier presents today for preoperative evaluation for left total knee arthroplasty with Wagner robotic assistance.  Patient had a right total knee arthroplasty on 12/01/2023 and has healed nicely from this.  She has no complaints about her right knee today.  She continues to have pain with long standing, walking and bending of the left knee would like to proceed with scheduling TKA today.  I reviewed the indications for surgery. The risks and benefits of the proposed and alternative treatments were discussed with the patient. Questions pertinent to the procedure were solicited and answered.  Patient was given instruction to call clinic with any further questions.No assurances were given. Informed consent was obtained. The patient expressed good understanding and wished to proceed with scheduling the procedure.     This patient has  Increased pain with activity Initiation  Interference with normal activities of daily living due to pain  Increased pain with weight bearing activities  Pain with range of motion          ROS:   Constitutional: No fever, weakness, or fatigue.   Ear/Nose/Mouth/Throat: No nasal congestion or sore throat.   Respiratory: No shortness of breath or cough.   Cardiovascular: No chest pain, palpitations, or peripheral edema.   Gastrointestinal: No nausea, vomiting, or abdominal pain.   Genitourinary: No dysuria.  Musculoskeletal:  Left knee pain, swelling, loss of motion.    Past Surgical History:   Procedure Laterality Date    BREAST LUMPECTOMY Right     x1 lymph node removed    COLONOSCOPY      HYSTERECTOMY      partial    REPAIR OF MENISCUS OF KNEE Right     ROBOTIC ARTHROPLASTY, KNEE Right 12/1/2023    Procedure: ROBOTIC ARTHROPLASTY, KNEE, TOTAL;  Surgeon: Tomas Gordillo MD;  Location: [facility];  Service:  Orthopedics;  Laterality: Right;        Past Medical History:   Diagnosis Date    Arthritis     Cancer     Breast cancer-right    Digestive disorder     reflux    Hypertension         Objective:    Vitals:    02/29/24 1351   BP: (!) 143/88   Pulse: 84        Physical Exam:    Appearance: No distress, good color on room air. Alert and cooperative.  HEENT: Normocephalic. PERRLA EOM intact.   Lungs: Breathing unlabored.  Heart: Regular rate and rhythm.  Abdomen: Soft, non-tender.  No rebound tenderness.  Extremities:  Examination of the left lower extremity compartments are soft and warm. Skin is intact. There are no signs or symptoms of DVT or infection. There is a mild joint effusion. There is no erythema. Tender to palpation along the anteromedial joint line , left knee range of motion is 5-105 degrees; varus deformity. The knee is stable to exam with varus and valgus stressing. Negative anterior and posterior drawer. Negative Lachman´s.  Negative Kenna's test. Patella grind is positive, Negative for apprehension. Neurovascularly intact distally.  Skin: No rashes or open wounds.    Images; x-rays; four views of the left knee demonstrate tricompartmental osteoarthritis.  Severe about the medial compartment.  Kg grade 4.  No obvious fracture dislocation.        Assessment:  1. Osteoarthritis of left knee, unspecified osteoarthritis type  - X-Ray Knee Complete 4 or More Views Left; Future  - CT Knee w/o Contrast Left w/Wagner Protocol  - Case Request Operating Room: ROBOTIC ARTHROPLASTY, KNEE, TOTAL  - Vital signs; Standing  - Insert peripheral IV; Standing  - Clip and Prep Other (please specifiy) (Operative site); Standing  - Cleanse with Chlorhexidine (CHG); Standing  - Diet NPO; Standing  - electrolyte-A infusion  - IP VTE LOW RISK PATIENT; Standing  - ceFAZolin (ANCEF) 2 g in dextrose 5 % (D5W) 50 mL IVPB  - acetaminophen tablet 1,000 mg  - gabapentin capsule 300 mg  - ondansetron disintegrating tablet 4 mg  -  scopolamine 1.3-1.5 mg (1 mg over 3 days) 1 patch  - tranexamic acid (LYSTEDA) tablet 1,950 mg  - POCT glucose; Standing  - CBC auto differential; Future  - Comprehensive metabolic panel; Future  - Urinalysis; Future  - X-Ray Chest PA And Lateral; Future  - EKG 12-lead; Future  - Inpatient consult to Anesthesiology; Standing  - Place in Outpatient; Standing  - Place FRIDA hose; Standing  - Place sequential compression device; Standing  - Chlorohexidine Gluconate Bath; Standing  - MRSA PCR; Future  - Hemoglobin A1C; Future  - MRSA PCR    2. Pre-op testing  - Vital signs; Standing  - Insert peripheral IV; Standing  - Clip and Prep Other (please specifiy) (Operative site); Standing  - Cleanse with Chlorhexidine (CHG); Standing  - Diet NPO; Standing  - electrolyte-A infusion  - IP VTE LOW RISK PATIENT; Standing  - ceFAZolin (ANCEF) 2 g in dextrose 5 % (D5W) 50 mL IVPB  - acetaminophen tablet 1,000 mg  - gabapentin capsule 300 mg  - ondansetron disintegrating tablet 4 mg  - scopolamine 1.3-1.5 mg (1 mg over 3 days) 1 patch  - tranexamic acid (LYSTEDA) tablet 1,950 mg  - POCT glucose; Standing  - CBC auto differential; Future  - Comprehensive metabolic panel; Future  - Urinalysis; Future  - X-Ray Chest PA And Lateral; Future  - EKG 12-lead; Future  - Inpatient consult to Anesthesiology; Standing  - Place in Outpatient; Standing  - Place FRIDA hose; Standing  - Place sequential compression device; Standing  - Chlorohexidine Gluconate Bath; Standing  - MRSA PCR; Future  - Hemoglobin A1C; Future  - MRSA PCR       Plan:  Plan for left total knee arthroplasty with Wagner robotic assistance at [facility] on 03/26/2024. The patient has been given preoperative instructions. Post-operative appointment is scheduled for 2 weeks.  Patient is already attending formal physical therapy for her right knee and will start incorporating her left knee as well.  Patient will need CT of the operative knee for preoperative surgical planning.  We did discuss  her Wegovy in the knee to stop it 7 days prior to surgical intervention.

## 2024-03-15 ENCOUNTER — TELEPHONE (OUTPATIENT)
Dept: ORTHOPEDICS | Facility: CLINIC | Age: 59
End: 2024-03-15
Payer: COMMERCIAL

## 2024-03-15 NOTE — TELEPHONE ENCOUNTER
PATIENT CALLED IN REGARDS TO HER SURGERY.     PATIENT NEEDS TO RESCHEDULE HER SURGERY.     SHE NEEDS TO RESCHEDULE FOR THE END OF MAY.     RESCHEDULED HER FOLLOW UP TO COME IN ON 4/25.    Pt verbalized understanding and will call with any questions or concerns.

## 2024-03-18 ENCOUNTER — TELEPHONE (OUTPATIENT)
Dept: ORTHOPEDICS | Facility: CLINIC | Age: 59
End: 2024-03-18
Payer: COMMERCIAL

## 2024-03-18 NOTE — TELEPHONE ENCOUNTER
Patient called to reschedule her surgery. She is able to do it at the end of May.     Her follow up to pick a date and resign papers is 4/25/24.    She has to change the date due having to wait a certain time between her surgeries for insurance reasons.

## 2024-04-25 ENCOUNTER — OFFICE VISIT (OUTPATIENT)
Dept: ORTHOPEDICS | Facility: CLINIC | Age: 59
End: 2024-04-25
Payer: COMMERCIAL

## 2024-04-25 VITALS
SYSTOLIC BLOOD PRESSURE: 138 MMHG | HEART RATE: 80 BPM | DIASTOLIC BLOOD PRESSURE: 87 MMHG | WEIGHT: 209 LBS | BODY MASS INDEX: 38.46 KG/M2 | HEIGHT: 62 IN

## 2024-04-25 DIAGNOSIS — M17.12 OSTEOARTHRITIS OF LEFT KNEE, UNSPECIFIED OSTEOARTHRITIS TYPE: Primary | ICD-10-CM

## 2024-04-25 DIAGNOSIS — Z01.818 PREOP TESTING: ICD-10-CM

## 2024-04-25 PROCEDURE — 3079F DIAST BP 80-89 MM HG: CPT | Mod: CPTII,,,

## 2024-04-25 PROCEDURE — 1160F RVW MEDS BY RX/DR IN RCRD: CPT | Mod: CPTII,,,

## 2024-04-25 PROCEDURE — 3008F BODY MASS INDEX DOCD: CPT | Mod: CPTII,,,

## 2024-04-25 PROCEDURE — 3075F SYST BP GE 130 - 139MM HG: CPT | Mod: CPTII,,,

## 2024-04-25 PROCEDURE — 4010F ACE/ARB THERAPY RXD/TAKEN: CPT | Mod: CPTII,,,

## 2024-04-25 PROCEDURE — 3044F HG A1C LEVEL LT 7.0%: CPT | Mod: CPTII,,,

## 2024-04-25 PROCEDURE — 99213 OFFICE O/P EST LOW 20 MIN: CPT | Mod: ,,,

## 2024-04-25 PROCEDURE — 1159F MED LIST DOCD IN RCRD: CPT | Mod: CPTII,,,

## 2024-04-25 RX ORDER — SODIUM CHLORIDE, SODIUM GLUCONATE, SODIUM ACETATE, POTASSIUM CHLORIDE AND MAGNESIUM CHLORIDE 30; 37; 368; 526; 502 MG/100ML; MG/100ML; MG/100ML; MG/100ML; MG/100ML
INJECTION, SOLUTION INTRAVENOUS CONTINUOUS
Status: CANCELLED | OUTPATIENT
Start: 2024-04-25

## 2024-04-25 RX ORDER — ACETAMINOPHEN 500 MG
1000 TABLET ORAL
Status: CANCELLED | OUTPATIENT
Start: 2024-04-25

## 2024-04-25 RX ORDER — OMEPRAZOLE 40 MG/1
CAPSULE, DELAYED RELEASE ORAL
COMMUNITY
Start: 2024-04-18

## 2024-04-25 RX ORDER — SCOLOPAMINE TRANSDERMAL SYSTEM 1 MG/1
1 PATCH, EXTENDED RELEASE TRANSDERMAL ONCE AS NEEDED
Status: CANCELLED | OUTPATIENT
Start: 2024-04-25 | End: 2035-09-22

## 2024-04-25 RX ORDER — ONDANSETRON 4 MG/1
4 TABLET, ORALLY DISINTEGRATING ORAL
Status: CANCELLED | OUTPATIENT
Start: 2024-04-25

## 2024-04-25 RX ORDER — TRANEXAMIC ACID 650 MG/1
1950 TABLET ORAL
Status: CANCELLED | OUTPATIENT
Start: 2024-04-25 | End: 2024-04-25

## 2024-04-25 RX ORDER — GABAPENTIN 100 MG/1
300 CAPSULE ORAL
Status: CANCELLED | OUTPATIENT
Start: 2024-04-25

## 2024-04-25 NOTE — H&P
Admission History & Physical    Subjective:    CC: Pre-op Exam of the Left Knee (Preop L TKA 5/28/24)       HPI:  Collette G Cormier presents today for preoperative evaluation for left total knee arthroplasty with Wagner robotic assistance.  Patient was scheduled to have this procedure done last month however due to insurance issues she had to push it back.  She denies any changes since her last visit.  She states she continues to have pain about the left knee with long standing, bending and walking.  She would like to proceed with rescheduling surgery today.  I reviewed the indications for surgery. The risks and benefits of the proposed and alternative treatments were discussed with the patient. Questions pertinent to the procedure were solicited and answered.  Patient was given instruction to call clinic with any further questions.No assurances were given. Informed consent was obtained. The patient expressed good understanding and wished to proceed with scheduling the procedure.     This patient has  Increased pain with activity Initiation  Interference with normal activities of daily living due to pain  Increased pain with weight bearing activities  Pain with range of motion      ROS:   Constitutional: No fever, weakness, or fatigue.   Ear/Nose/Mouth/Throat: No nasal congestion or sore throat.   Respiratory: No shortness of breath or cough.   Cardiovascular: No chest pain, palpitations, or peripheral edema.   Gastrointestinal: No nausea, vomiting, or abdominal pain.   Genitourinary: No dysuria.  Musculoskeletal:  Left knee pain, swelling, loss of motion.    Past Surgical History:   Procedure Laterality Date    BREAST LUMPECTOMY Right     x1 lymph node removed    COLONOSCOPY      HYSTERECTOMY      partial    REPAIR OF MENISCUS OF KNEE Right     ROBOTIC ARTHROPLASTY, KNEE Right 12/1/2023    Procedure: ROBOTIC ARTHROPLASTY, KNEE, TOTAL;  Surgeon: Tomas Gordillo MD;  Location: [facility];  Service: Orthopedics;   Laterality: Right;        Past Medical History:   Diagnosis Date    Arthritis     Cancer     Breast cancer-right    Digestive disorder     reflux    Hypertension         Objective:    Vitals:    04/25/24 1446   BP: 138/87   Pulse: 80        Physical Exam:    Appearance: No distress, good color on room air. Alert and cooperative.  HEENT: Normocephalic. PERRLA EOM intact.   Lungs: Breathing unlabored.  Heart: Regular rate and rhythm.  Abdomen: Soft, non-tender.  No rebound tenderness.  Extremities: Examination of the left lower extremity compartments are soft and warm. Skin is intact. There are no signs or symptoms of DVT or infection. There is a mild joint effusion. There is no erythema. Tender to palpation along the anteromedial joint line , left knee range of motion is 5-105 degrees; varus deformity. The knee is stable to exam with varus and valgus stressing. Negative anterior and posterior drawer. Negative Lachman´s. Negative Kenna's test. Patella grind is positive, Negative for apprehension. Neurovascularly intact distally.   Skin: No rashes or open wounds.        Assessment:  1. Osteoarthritis of left knee, unspecified osteoarthritis type  - Case Request Operating Room: ROBOTIC ARTHROPLASTY, KNEE, TOTAL  - Vital signs; Standing  - Insert peripheral IV; Standing  - Clip and Prep Other (please specifiy) (Operative site); Standing  - Cleanse with Chlorhexidine (CHG); Standing  - Diet NPO; Standing  - electrolyte-A infusion  - IP VTE LOW RISK PATIENT; Standing  - ceFAZolin (ANCEF) 2 g in dextrose 5 % (D5W) 50 mL IVPB  - acetaminophen tablet 1,000 mg  - gabapentin capsule 300 mg  - ondansetron disintegrating tablet 4 mg  - scopolamine 1.3-1.5 mg (1 mg over 3 days) 1 patch  - tranexamic acid (LYSTEDA) tablet 1,950 mg  - POCT glucose; Standing  - CBC auto differential; Future  - Comprehensive metabolic panel; Future  - Urinalysis; Future  - X-Ray Chest PA And Lateral; Future  - EKG 12-lead; Future  - Inpatient  consult to Anesthesiology; Standing  - Place in Outpatient; Standing  - Place FRIDA hose; Standing  - Place sequential compression device; Standing  - Chlorohexidine Gluconate Bath; Standing  - Hemoglobin A1C; Future  - MRSA PCR; Future  - MRSA PCR    2. Preop testing  - Case Request Operating Room: ROBOTIC ARTHROPLASTY, KNEE, TOTAL  - Vital signs; Standing  - Insert peripheral IV; Standing  - Clip and Prep Other (please specifiy) (Operative site); Standing  - Cleanse with Chlorhexidine (CHG); Standing  - Diet NPO; Standing  - electrolyte-A infusion  - IP VTE LOW RISK PATIENT; Standing  - ceFAZolin (ANCEF) 2 g in dextrose 5 % (D5W) 50 mL IVPB  - acetaminophen tablet 1,000 mg  - gabapentin capsule 300 mg  - ondansetron disintegrating tablet 4 mg  - scopolamine 1.3-1.5 mg (1 mg over 3 days) 1 patch  - tranexamic acid (LYSTEDA) tablet 1,950 mg  - POCT glucose; Standing  - CBC auto differential; Future  - Comprehensive metabolic panel; Future  - Urinalysis; Future  - X-Ray Chest PA And Lateral; Future  - EKG 12-lead; Future  - Inpatient consult to Anesthesiology; Standing  - Place in Outpatient; Standing  - Place FRIDA hose; Standing  - Place sequential compression device; Standing  - Chlorohexidine Gluconate Bath; Standing  - Hemoglobin A1C; Future  - MRSA PCR; Future  - MRSA PCR       Plan:  Plan for left total knee arthroplasty with [employer] robotic assistance at [facility] on 05/28/2024. The patient has been given preoperative instructions. Post-operative appointment is scheduled for 2 weeks.  Patient was given knee exercises for preop rehab.  Patient already had CT done for pre-surgical planning.  We already have her primary care physician surgical clearance.  No significant changes.  We will repeat labs.

## 2024-04-29 ENCOUNTER — HOSPITAL ENCOUNTER (OUTPATIENT)
Dept: RADIOLOGY | Facility: HOSPITAL | Age: 59
Discharge: HOME | End: 2024-04-29
Payer: COMMERCIAL

## 2024-04-29 DIAGNOSIS — M17.12 OSTEOARTHRITIS OF LEFT KNEE, UNSPECIFIED OSTEOARTHRITIS TYPE: ICD-10-CM

## 2024-04-29 DIAGNOSIS — Z01.818 PREOP TESTING: ICD-10-CM

## 2024-04-29 LAB — MRSA PCR SCRN (OHS): DETECTED

## 2024-04-29 PROCEDURE — 71046 X-RAY EXAM CHEST 2 VIEWS: CPT | Mod: TC

## 2024-04-30 ENCOUNTER — TELEPHONE (OUTPATIENT)
Dept: ORTHOPEDICS | Facility: CLINIC | Age: 59
End: 2024-04-30
Payer: COMMERCIAL

## 2024-04-30 DIAGNOSIS — Z01.818 PREOP TESTING: Primary | ICD-10-CM

## 2024-04-30 DIAGNOSIS — M17.12 OSTEOARTHRITIS OF LEFT KNEE, UNSPECIFIED OSTEOARTHRITIS TYPE: ICD-10-CM

## 2024-04-30 RX ORDER — MUPIROCIN 20 MG/G
OINTMENT TOPICAL
Qty: 30 G | Refills: 0 | Status: ON HOLD | OUTPATIENT
Start: 2024-04-30 | End: 2024-05-28 | Stop reason: ALTCHOICE

## 2024-05-21 ENCOUNTER — ANESTHESIA EVENT (OUTPATIENT)
Dept: SURGERY | Facility: HOSPITAL | Age: 59
End: 2024-05-21
Payer: COMMERCIAL

## 2024-05-28 ENCOUNTER — ANESTHESIA (OUTPATIENT)
Dept: SURGERY | Facility: HOSPITAL | Age: 59
End: 2024-05-28
Payer: COMMERCIAL

## 2024-05-28 ENCOUNTER — HOSPITAL ENCOUNTER (OUTPATIENT)
Facility: HOSPITAL | Age: 59
Discharge: HOME | End: 2024-05-29
Attending: ORTHOPAEDIC SURGERY | Admitting: ORTHOPAEDIC SURGERY
Payer: COMMERCIAL

## 2024-05-28 DIAGNOSIS — Z01.818 PREOP TESTING: ICD-10-CM

## 2024-05-28 DIAGNOSIS — M17.12 OSTEOARTHRITIS OF LEFT KNEE, UNSPECIFIED OSTEOARTHRITIS TYPE: ICD-10-CM

## 2024-05-28 LAB
HCT VFR BLD AUTO: 33.8 % (ref 37–47)
HGB BLD-MCNC: 11.1 G/DL (ref 12–16)
POCT GLUCOSE: 104 MG/DL (ref 70–110)

## 2024-05-28 PROCEDURE — 96367 TX/PROPH/DG ADDL SEQ IV INF: CPT | Mod: 59

## 2024-05-28 PROCEDURE — 27447 TOTAL KNEE ARTHROPLASTY: CPT | Mod: AS,LT,,

## 2024-05-28 PROCEDURE — 88311 DECALCIFY TISSUE: CPT

## 2024-05-28 PROCEDURE — 88305 TISSUE EXAM BY PATHOLOGIST: CPT | Performed by: ORTHOPAEDIC SURGERY

## 2024-05-28 PROCEDURE — 99900035 HC TECH TIME PER 15 MIN (STAT)

## 2024-05-28 PROCEDURE — 27000221 HC OXYGEN, UP TO 24 HOURS

## 2024-05-28 PROCEDURE — 94799 UNLISTED PULMONARY SVC/PX: CPT

## 2024-05-28 PROCEDURE — 96376 TX/PRO/DX INJ SAME DRUG ADON: CPT | Mod: 59

## 2024-05-28 PROCEDURE — 25000003 PHARM REV CODE 250: Performed by: NURSE PRACTITIONER

## 2024-05-28 PROCEDURE — 36000713 HC OR TIME LEV V EA ADD 15 MIN: Performed by: ORTHOPAEDIC SURGERY

## 2024-05-28 PROCEDURE — D9220A PRA ANESTHESIA: Mod: CRNA,,,

## 2024-05-28 PROCEDURE — G0378 HOSPITAL OBSERVATION PER HR: HCPCS

## 2024-05-28 PROCEDURE — 63600175 PHARM REV CODE 636 W HCPCS: Performed by: ORTHOPAEDIC SURGERY

## 2024-05-28 PROCEDURE — 37000009 HC ANESTHESIA EA ADD 15 MINS: Performed by: ORTHOPAEDIC SURGERY

## 2024-05-28 PROCEDURE — 99900031 HC PATIENT EDUCATION (STAT)

## 2024-05-28 PROCEDURE — 25000003 PHARM REV CODE 250

## 2024-05-28 PROCEDURE — 96361 HYDRATE IV INFUSION ADD-ON: CPT

## 2024-05-28 PROCEDURE — 63600175 PHARM REV CODE 636 W HCPCS: Mod: JZ,JG | Performed by: ANESTHESIOLOGY

## 2024-05-28 PROCEDURE — 96375 TX/PRO/DX INJ NEW DRUG ADDON: CPT | Mod: 59

## 2024-05-28 PROCEDURE — D9220A PRA ANESTHESIA: Mod: ANES,,, | Performed by: ANESTHESIOLOGY

## 2024-05-28 PROCEDURE — 36415 COLL VENOUS BLD VENIPUNCTURE: CPT

## 2024-05-28 PROCEDURE — 51798 US URINE CAPACITY MEASURE: CPT

## 2024-05-28 PROCEDURE — 85018 HEMOGLOBIN: CPT

## 2024-05-28 PROCEDURE — 27201423 OPTIME MED/SURG SUP & DEVICES STERILE SUPPLY: Performed by: ORTHOPAEDIC SURGERY

## 2024-05-28 PROCEDURE — C1713 ANCHOR/SCREW BN/BN,TIS/BN: HCPCS | Performed by: ORTHOPAEDIC SURGERY

## 2024-05-28 PROCEDURE — 0055T BONE SRGRY CMPTR CT/MRI IMAG: CPT | Mod: ,,, | Performed by: ORTHOPAEDIC SURGERY

## 2024-05-28 PROCEDURE — 64447 NJX AA&/STRD FEMORAL NRV IMG: CPT | Mod: 59,LT,, | Performed by: ANESTHESIOLOGY

## 2024-05-28 PROCEDURE — 25000003 PHARM REV CODE 250: Performed by: ORTHOPAEDIC SURGERY

## 2024-05-28 PROCEDURE — 94761 N-INVAS EAR/PLS OXIMETRY MLT: CPT

## 2024-05-28 PROCEDURE — 96366 THER/PROPH/DIAG IV INF ADDON: CPT

## 2024-05-28 PROCEDURE — 63600175 PHARM REV CODE 636 W HCPCS

## 2024-05-28 PROCEDURE — A4216 STERILE WATER/SALINE, 10 ML: HCPCS | Performed by: ORTHOPAEDIC SURGERY

## 2024-05-28 PROCEDURE — 37000008 HC ANESTHESIA 1ST 15 MINUTES: Performed by: ORTHOPAEDIC SURGERY

## 2024-05-28 PROCEDURE — 36000712 HC OR TIME LEV V 1ST 15 MIN: Performed by: ORTHOPAEDIC SURGERY

## 2024-05-28 PROCEDURE — C1776 JOINT DEVICE (IMPLANTABLE): HCPCS | Performed by: ORTHOPAEDIC SURGERY

## 2024-05-28 PROCEDURE — 82962 GLUCOSE BLOOD TEST: CPT | Performed by: ORTHOPAEDIC SURGERY

## 2024-05-28 PROCEDURE — 97162 PT EVAL MOD COMPLEX 30 MIN: CPT

## 2024-05-28 PROCEDURE — 71000033 HC RECOVERY, INTIAL HOUR: Performed by: ORTHOPAEDIC SURGERY

## 2024-05-28 PROCEDURE — 27447 TOTAL KNEE ARTHROPLASTY: CPT | Mod: LT,,, | Performed by: ORTHOPAEDIC SURGERY

## 2024-05-28 PROCEDURE — 64447 NJX AA&/STRD FEMORAL NRV IMG: CPT | Mod: 59,LT | Performed by: ANESTHESIOLOGY

## 2024-05-28 PROCEDURE — 96365 THER/PROPH/DIAG IV INF INIT: CPT | Mod: 59

## 2024-05-28 PROCEDURE — 71000039 HC RECOVERY, EACH ADD'L HOUR: Performed by: ORTHOPAEDIC SURGERY

## 2024-05-28 DEVICE — CRUCIATE RETAINING FEMORAL
Type: IMPLANTABLE DEVICE | Site: KNEE | Status: FUNCTIONAL
Brand: TRIATHLON

## 2024-05-28 DEVICE — PRIMARY TIBIAL BASEPLATE
Type: IMPLANTABLE DEVICE | Site: KNEE | Status: FUNCTIONAL
Brand: TRIATHLON

## 2024-05-28 DEVICE — TOBRA FULL DOSE ANTIBIOTIC BONE CEMENT, 10 PACK CATALOG NUMBER IS 6197-9-010
Type: IMPLANTABLE DEVICE | Site: KNEE | Status: FUNCTIONAL
Brand: SIMPLEX

## 2024-05-28 DEVICE — ASYMMETRIC PATELLA
Type: IMPLANTABLE DEVICE | Site: KNEE | Status: FUNCTIONAL
Brand: TRIATHLON

## 2024-05-28 RX ORDER — SODIUM CHLORIDE, SODIUM GLUCONATE, SODIUM ACETATE, POTASSIUM CHLORIDE AND MAGNESIUM CHLORIDE 30; 37; 368; 526; 502 MG/100ML; MG/100ML; MG/100ML; MG/100ML; MG/100ML
INJECTION, SOLUTION INTRAVENOUS CONTINUOUS
Status: DISCONTINUED | OUTPATIENT
Start: 2024-05-28 | End: 2024-05-28

## 2024-05-28 RX ORDER — HYDROCODONE BITARTRATE AND ACETAMINOPHEN 10; 325 MG/1; MG/1
1 TABLET ORAL EVERY 4 HOURS PRN
Status: DISCONTINUED | OUTPATIENT
Start: 2024-05-28 | End: 2024-05-29 | Stop reason: HOSPADM

## 2024-05-28 RX ORDER — ROPIVACAINE HYDROCHLORIDE 5 MG/ML
INJECTION, SOLUTION EPIDURAL; INFILTRATION; PERINEURAL
Status: DISPENSED
Start: 2024-05-28 | End: 2024-05-28

## 2024-05-28 RX ORDER — SODIUM CHLORIDE 0.9 % (FLUSH) 0.9 %
SYRINGE (ML) INJECTION
Status: DISPENSED
Start: 2024-05-28 | End: 2024-05-28

## 2024-05-28 RX ORDER — LISINOPRIL 10 MG/1
20 TABLET ORAL DAILY
Status: DISCONTINUED | OUTPATIENT
Start: 2024-05-29 | End: 2024-05-29 | Stop reason: HOSPADM

## 2024-05-28 RX ORDER — MIDAZOLAM HYDROCHLORIDE 1 MG/ML
INJECTION INTRAMUSCULAR; INTRAVENOUS
Status: DISCONTINUED | OUTPATIENT
Start: 2024-05-28 | End: 2024-05-28

## 2024-05-28 RX ORDER — KETOROLAC TROMETHAMINE 30 MG/ML
INJECTION, SOLUTION INTRAMUSCULAR; INTRAVENOUS
Status: DISPENSED
Start: 2024-05-28 | End: 2024-05-28

## 2024-05-28 RX ORDER — TRANEXAMIC ACID 650 MG/1
1950 TABLET ORAL
Status: COMPLETED | OUTPATIENT
Start: 2024-05-28 | End: 2024-05-28

## 2024-05-28 RX ORDER — ISOSORBIDE DINITRATE 20 MG/1
20 TABLET ORAL 2 TIMES DAILY
COMMUNITY

## 2024-05-28 RX ORDER — EPINEPHRINE 1 MG/ML
INJECTION, SOLUTION, CONCENTRATE INTRAVENOUS
Status: DISPENSED
Start: 2024-05-28 | End: 2024-05-28

## 2024-05-28 RX ORDER — DOXYCYCLINE HYCLATE 100 MG
100 TABLET ORAL EVERY 12 HOURS
Status: DISCONTINUED | OUTPATIENT
Start: 2024-05-29 | End: 2024-05-29 | Stop reason: HOSPADM

## 2024-05-28 RX ORDER — KETOROLAC TROMETHAMINE 30 MG/ML
INJECTION, SOLUTION INTRAMUSCULAR; INTRAVENOUS
Status: DISCONTINUED | OUTPATIENT
Start: 2024-05-28 | End: 2024-05-28 | Stop reason: HOSPADM

## 2024-05-28 RX ORDER — ONDANSETRON 4 MG/1
4 TABLET, ORALLY DISINTEGRATING ORAL
Status: COMPLETED | OUTPATIENT
Start: 2024-05-28 | End: 2024-05-28

## 2024-05-28 RX ORDER — BISACODYL 10 MG/1
10 SUPPOSITORY RECTAL DAILY
Status: DISCONTINUED | OUTPATIENT
Start: 2024-05-31 | End: 2024-05-29 | Stop reason: HOSPADM

## 2024-05-28 RX ORDER — TALC
6 POWDER (GRAM) TOPICAL NIGHTLY PRN
Status: DISCONTINUED | OUTPATIENT
Start: 2024-05-28 | End: 2024-05-29 | Stop reason: HOSPADM

## 2024-05-28 RX ORDER — HYDROCODONE BITARTRATE AND ACETAMINOPHEN 7.5; 325 MG/1; MG/1
1 TABLET ORAL EVERY 4 HOURS PRN
Status: DISCONTINUED | OUTPATIENT
Start: 2024-05-28 | End: 2024-05-29 | Stop reason: HOSPADM

## 2024-05-28 RX ORDER — METOCLOPRAMIDE 10 MG/1
10 TABLET ORAL EVERY 6 HOURS
Status: DISCONTINUED | OUTPATIENT
Start: 2024-05-28 | End: 2024-05-29 | Stop reason: HOSPADM

## 2024-05-28 RX ORDER — GABAPENTIN 300 MG/1
300 CAPSULE ORAL
Status: COMPLETED | OUTPATIENT
Start: 2024-05-28 | End: 2024-05-28

## 2024-05-28 RX ORDER — ISOSORBIDE DINITRATE 20 MG/1
20 TABLET ORAL 2 TIMES DAILY
Status: DISCONTINUED | OUTPATIENT
Start: 2024-05-28 | End: 2024-05-29 | Stop reason: HOSPADM

## 2024-05-28 RX ORDER — DOCUSATE SODIUM 100 MG/1
200 CAPSULE, LIQUID FILLED ORAL DAILY
Status: DISCONTINUED | OUTPATIENT
Start: 2024-05-29 | End: 2024-05-29 | Stop reason: HOSPADM

## 2024-05-28 RX ORDER — ANASTROZOLE 1 MG/1
1 TABLET ORAL DAILY
Status: DISCONTINUED | OUTPATIENT
Start: 2024-05-28 | End: 2024-05-29 | Stop reason: HOSPADM

## 2024-05-28 RX ORDER — GLYCOPYRROLATE 0.2 MG/ML
INJECTION INTRAMUSCULAR; INTRAVENOUS
Status: DISCONTINUED | OUTPATIENT
Start: 2024-05-28 | End: 2024-05-28

## 2024-05-28 RX ORDER — ONDANSETRON HYDROCHLORIDE 2 MG/ML
4 INJECTION, SOLUTION INTRAVENOUS EVERY 6 HOURS PRN
Status: DISCONTINUED | OUTPATIENT
Start: 2024-05-28 | End: 2024-05-29 | Stop reason: HOSPADM

## 2024-05-28 RX ORDER — ACETAMINOPHEN 500 MG
1000 TABLET ORAL
Status: COMPLETED | OUTPATIENT
Start: 2024-05-28 | End: 2024-05-28

## 2024-05-28 RX ORDER — LIDOCAINE HYDROCHLORIDE 20 MG/ML
INJECTION INTRAVENOUS
Status: DISCONTINUED | OUTPATIENT
Start: 2024-05-28 | End: 2024-05-28

## 2024-05-28 RX ORDER — DEXMEDETOMIDINE HYDROCHLORIDE 100 UG/ML
INJECTION, SOLUTION INTRAVENOUS
Status: DISCONTINUED | OUTPATIENT
Start: 2024-05-28 | End: 2024-05-28

## 2024-05-28 RX ORDER — SODIUM CHLORIDE 9 MG/ML
INJECTION, SOLUTION INTRAMUSCULAR; INTRAVENOUS; SUBCUTANEOUS
Status: DISCONTINUED | OUTPATIENT
Start: 2024-05-28 | End: 2024-05-28 | Stop reason: HOSPADM

## 2024-05-28 RX ORDER — LACTULOSE 10 G/15ML
20 SOLUTION ORAL EVERY 6 HOURS PRN
Status: DISCONTINUED | OUTPATIENT
Start: 2024-05-28 | End: 2024-05-29 | Stop reason: HOSPADM

## 2024-05-28 RX ORDER — HYDROCODONE BITARTRATE AND ACETAMINOPHEN 5; 325 MG/1; MG/1
1 TABLET ORAL EVERY 4 HOURS PRN
Status: DISCONTINUED | OUTPATIENT
Start: 2024-05-28 | End: 2024-05-29 | Stop reason: HOSPADM

## 2024-05-28 RX ORDER — EPHEDRINE SULFATE 50 MG/ML
INJECTION, SOLUTION INTRAVENOUS
Status: DISCONTINUED | OUTPATIENT
Start: 2024-05-28 | End: 2024-05-28

## 2024-05-28 RX ORDER — METHOCARBAMOL 750 MG/1
750 TABLET, FILM COATED ORAL EVERY 8 HOURS PRN
Status: DISCONTINUED | OUTPATIENT
Start: 2024-05-28 | End: 2024-05-29 | Stop reason: HOSPADM

## 2024-05-28 RX ORDER — POLYETHYLENE GLYCOL 3350 17 G/17G
17 POWDER, FOR SOLUTION ORAL NIGHTLY
Status: DISCONTINUED | OUTPATIENT
Start: 2024-05-28 | End: 2024-05-29 | Stop reason: HOSPADM

## 2024-05-28 RX ORDER — PROPOFOL 10 MG/ML
VIAL (ML) INTRAVENOUS CONTINUOUS PRN
Status: DISCONTINUED | OUTPATIENT
Start: 2024-05-28 | End: 2024-05-28

## 2024-05-28 RX ORDER — HYDRALAZINE HYDROCHLORIDE 25 MG/1
25 TABLET, FILM COATED ORAL 2 TIMES DAILY
Status: DISCONTINUED | OUTPATIENT
Start: 2024-05-29 | End: 2024-05-28

## 2024-05-28 RX ORDER — EPINEPHRINE 1 MG/ML
INJECTION, SOLUTION, CONCENTRATE INTRAVENOUS
Status: DISCONTINUED | OUTPATIENT
Start: 2024-05-28 | End: 2024-05-28 | Stop reason: HOSPADM

## 2024-05-28 RX ORDER — ACETAMINOPHEN 10 MG/ML
1000 INJECTION, SOLUTION INTRAVENOUS ONCE
Status: COMPLETED | OUTPATIENT
Start: 2024-05-28 | End: 2024-05-28

## 2024-05-28 RX ORDER — NAPROXEN SODIUM 220 MG/1
81 TABLET, FILM COATED ORAL 2 TIMES DAILY
Status: DISCONTINUED | OUTPATIENT
Start: 2024-05-29 | End: 2024-05-29 | Stop reason: HOSPADM

## 2024-05-28 RX ORDER — METOCLOPRAMIDE HYDROCHLORIDE 5 MG/ML
10 INJECTION INTRAMUSCULAR; INTRAVENOUS
Status: DISCONTINUED | OUTPATIENT
Start: 2024-05-28 | End: 2024-05-28

## 2024-05-28 RX ORDER — DILTIAZEM HYDROCHLORIDE 180 MG/1
360 CAPSULE, COATED, EXTENDED RELEASE ORAL EVERY MORNING
Status: DISCONTINUED | OUTPATIENT
Start: 2024-05-29 | End: 2024-05-29 | Stop reason: HOSPADM

## 2024-05-28 RX ORDER — DEXAMETHASONE SODIUM PHOSPHATE 4 MG/ML
INJECTION, SOLUTION INTRA-ARTICULAR; INTRALESIONAL; INTRAMUSCULAR; INTRAVENOUS; SOFT TISSUE
Status: DISCONTINUED | OUTPATIENT
Start: 2024-05-28 | End: 2024-05-28

## 2024-05-28 RX ORDER — ROPIVACAINE HYDROCHLORIDE 5 MG/ML
INJECTION, SOLUTION EPIDURAL; INFILTRATION; PERINEURAL
Status: DISCONTINUED | OUTPATIENT
Start: 2024-05-28 | End: 2024-05-28 | Stop reason: HOSPADM

## 2024-05-28 RX ORDER — BUPIVACAINE HYDROCHLORIDE 2.5 MG/ML
INJECTION, SOLUTION EPIDURAL; INFILTRATION; INTRACAUDAL
Status: DISCONTINUED | OUTPATIENT
Start: 2024-05-28 | End: 2024-05-28

## 2024-05-28 RX ORDER — ONDANSETRON HYDROCHLORIDE 2 MG/ML
INJECTION, SOLUTION INTRAVENOUS
Status: DISCONTINUED | OUTPATIENT
Start: 2024-05-28 | End: 2024-05-28

## 2024-05-28 RX ORDER — MORPHINE SULFATE 10 MG/ML
INJECTION INTRAMUSCULAR; INTRAVENOUS; SUBCUTANEOUS
Status: DISPENSED
Start: 2024-05-28 | End: 2024-05-28

## 2024-05-28 RX ORDER — GABAPENTIN 300 MG/1
300 CAPSULE ORAL NIGHTLY
Status: DISCONTINUED | OUTPATIENT
Start: 2024-05-28 | End: 2024-05-29 | Stop reason: HOSPADM

## 2024-05-28 RX ORDER — FAMOTIDINE 20 MG/1
20 TABLET, FILM COATED ORAL 2 TIMES DAILY
Status: DISCONTINUED | OUTPATIENT
Start: 2024-05-28 | End: 2024-05-29 | Stop reason: HOSPADM

## 2024-05-28 RX ORDER — AMOXICILLIN 250 MG
2 CAPSULE ORAL 2 TIMES DAILY
Status: DISCONTINUED | OUTPATIENT
Start: 2024-05-28 | End: 2024-05-29 | Stop reason: HOSPADM

## 2024-05-28 RX ORDER — ALUMINUM HYDROXIDE, MAGNESIUM HYDROXIDE, AND SIMETHICONE 1200; 120; 1200 MG/30ML; MG/30ML; MG/30ML
30 SUSPENSION ORAL EVERY 6 HOURS PRN
Status: DISCONTINUED | OUTPATIENT
Start: 2024-05-28 | End: 2024-05-29 | Stop reason: HOSPADM

## 2024-05-28 RX ORDER — SODIUM CHLORIDE 9 MG/ML
INJECTION, SOLUTION INTRAVENOUS CONTINUOUS
Status: DISCONTINUED | OUTPATIENT
Start: 2024-05-28 | End: 2024-05-29 | Stop reason: HOSPADM

## 2024-05-28 RX ORDER — SCOLOPAMINE TRANSDERMAL SYSTEM 1 MG/1
1 PATCH, EXTENDED RELEASE TRANSDERMAL ONCE AS NEEDED
Status: DISCONTINUED | OUTPATIENT
Start: 2024-05-28 | End: 2024-05-28 | Stop reason: HOSPADM

## 2024-05-28 RX ORDER — BUPIVACAINE HYDROCHLORIDE 7.5 MG/ML
INJECTION, SOLUTION EPIDURAL; RETROBULBAR
Status: DISCONTINUED | OUTPATIENT
Start: 2024-05-28 | End: 2024-05-28

## 2024-05-28 RX ORDER — MORPHINE SULFATE 10 MG/ML
INJECTION INTRAMUSCULAR; INTRAVENOUS; SUBCUTANEOUS
Status: DISCONTINUED | OUTPATIENT
Start: 2024-05-28 | End: 2024-05-28 | Stop reason: HOSPADM

## 2024-05-28 RX ORDER — BUPIVACAINE HYDROCHLORIDE 2.5 MG/ML
INJECTION, SOLUTION EPIDURAL; INFILTRATION; INTRACAUDAL
Status: COMPLETED
Start: 2024-05-28 | End: 2024-05-28

## 2024-05-28 RX ADMIN — PROPOFOL 75 MCG/KG/MIN: 10 INJECTION, EMULSION INTRAVENOUS at 09:05

## 2024-05-28 RX ADMIN — EPHEDRINE SULFATE 10 MG: 50 INJECTION INTRAVENOUS at 10:05

## 2024-05-28 RX ADMIN — VANCOMYCIN HYDROCHLORIDE 1500 MG: 1.5 INJECTION, POWDER, LYOPHILIZED, FOR SOLUTION INTRAVENOUS at 09:05

## 2024-05-28 RX ADMIN — ACETAMINOPHEN 1000 MG: 1000 INJECTION INTRAVENOUS at 05:05

## 2024-05-28 RX ADMIN — GLYCOPYRROLATE 0.2 MG: 0.2 INJECTION INTRAMUSCULAR; INTRAVENOUS at 09:05

## 2024-05-28 RX ADMIN — CEFAZOLIN 2 G: 2 INJECTION, POWDER, FOR SOLUTION INTRAMUSCULAR; INTRAVENOUS at 09:05

## 2024-05-28 RX ADMIN — DEXMEDETOMIDINE HYDROCHLORIDE 4 MCG: 100 INJECTION, SOLUTION INTRAVENOUS at 10:05

## 2024-05-28 RX ADMIN — GABAPENTIN 300 MG: 300 CAPSULE ORAL at 08:05

## 2024-05-28 RX ADMIN — ONDANSETRON 4 MG: 4 TABLET, ORALLY DISINTEGRATING ORAL at 09:05

## 2024-05-28 RX ADMIN — BUPIVACAINE HYDROCHLORIDE 30 ML: 2.5 INJECTION, SOLUTION EPIDURAL; INFILTRATION; INTRACAUDAL; PERINEURAL at 12:05

## 2024-05-28 RX ADMIN — SODIUM CHLORIDE, SODIUM GLUCONATE, SODIUM ACETATE, POTASSIUM CHLORIDE AND MAGNESIUM CHLORIDE: 526; 502; 368; 37; 30 INJECTION, SOLUTION INTRAVENOUS at 09:05

## 2024-05-28 RX ADMIN — METOCLOPRAMIDE 10 MG: 5 INJECTION, SOLUTION INTRAMUSCULAR; INTRAVENOUS at 02:05

## 2024-05-28 RX ADMIN — ACETAMINOPHEN 1000 MG: 500 TABLET ORAL at 09:05

## 2024-05-28 RX ADMIN — DEXAMETHASONE SODIUM PHOSPHATE 8 MG: 4 INJECTION, SOLUTION INTRA-ARTICULAR; INTRALESIONAL; INTRAMUSCULAR; INTRAVENOUS; SOFT TISSUE at 10:05

## 2024-05-28 RX ADMIN — ISOSORBIDE DINITRATE 20 MG: 20 TABLET ORAL at 08:05

## 2024-05-28 RX ADMIN — METOCLOPRAMIDE 10 MG: 10 TABLET ORAL at 08:05

## 2024-05-28 RX ADMIN — METOCLOPRAMIDE 10 MG: 10 TABLET ORAL at 11:05

## 2024-05-28 RX ADMIN — SODIUM CHLORIDE: 9 INJECTION, SOLUTION INTRAVENOUS at 12:05

## 2024-05-28 RX ADMIN — HYDROCODONE BITARTRATE AND ACETAMINOPHEN 1 TABLET: 5; 325 TABLET ORAL at 01:05

## 2024-05-28 RX ADMIN — FAMOTIDINE 20 MG: 20 TABLET ORAL at 08:05

## 2024-05-28 RX ADMIN — SENNOSIDES AND DOCUSATE SODIUM 2 TABLET: 8.6; 5 TABLET ORAL at 08:05

## 2024-05-28 RX ADMIN — EPHEDRINE SULFATE 10 MG: 50 INJECTION INTRAVENOUS at 11:05

## 2024-05-28 RX ADMIN — CEFAZOLIN 2 G: 2 INJECTION, POWDER, FOR SOLUTION INTRAMUSCULAR; INTRAVENOUS at 10:05

## 2024-05-28 RX ADMIN — PHENYLEPHRINE HYDROCHLORIDE 25 MCG/MIN: 10 INJECTION INTRAVENOUS at 10:05

## 2024-05-28 RX ADMIN — POLYETHYLENE GLYCOL 3350 17 G: 17 POWDER, FOR SOLUTION ORAL at 08:05

## 2024-05-28 RX ADMIN — MIDAZOLAM 2 MG: 1 INJECTION INTRAMUSCULAR; INTRAVENOUS at 09:05

## 2024-05-28 RX ADMIN — TRANEXAMIC ACID 1950 MG: 650 TABLET ORAL at 09:05

## 2024-05-28 RX ADMIN — GABAPENTIN 300 MG: 300 CAPSULE ORAL at 09:05

## 2024-05-28 RX ADMIN — CEFAZOLIN 2 G: 2 INJECTION, POWDER, FOR SOLUTION INTRAMUSCULAR; INTRAVENOUS at 04:05

## 2024-05-28 RX ADMIN — HYDRALAZINE HYDROCHLORIDE 35 MG: 25 TABLET, FILM COATED ORAL at 08:05

## 2024-05-28 RX ADMIN — ONDANSETRON HYDROCHLORIDE 4 MG: 2 SOLUTION INTRAMUSCULAR; INTRAVENOUS at 11:05

## 2024-05-28 RX ADMIN — BUPIVACAINE HYDROCHLORIDE 1.8 ML: 7.5 INJECTION, SOLUTION EPIDURAL; RETROBULBAR at 09:05

## 2024-05-28 RX ADMIN — HYDROCODONE BITARTRATE AND ACETAMINOPHEN 1 TABLET: 7.5; 325 TABLET ORAL at 08:05

## 2024-05-28 RX ADMIN — LIDOCAINE HYDROCHLORIDE 50 MG: 20 INJECTION INTRAVENOUS at 09:05

## 2024-05-28 NOTE — PT/OT/SLP EVAL
Physical Therapy Evaluation     Patient Name: Collette G Cormier   MRN: [id number]  Recent Surgery: Procedure(s) (LRB):  ROBOTIC ARTHROPLASTY, KNEE, TOTAL (Left) Day of Surgery    Recommendations:     Discharge Recommendations: Low Intensity Therapy (outpaitent)   Discharge Equipment Recommendations: none   Barriers to discharge: None    Assessment:     Collette G Cormier is a 58 y.o. female admitted with a medical diagnosis of Osteoarthritis of left knee. She presents with the following impairments/functional limitations: impaired functional mobility, gait instability, decreased ROM.     Rehab Prognosis: Good; patient would benefit from acute PT services to address these deficits and reach maximum level of function.    Plan:     During this hospitalization, patient to be seen BID to address the above listed problems via gait training, therapeutic activities, therapeutic exercises    Plan of Care Expires: 06/03/24    Subjective     Chief Complaint: N/A  Patient Comments/Goals: N/A  Pain/Comfort:  Pain Rating 1: 6/10  Location - Side 1: Left  Location - Orientation 1: upper  Location 1: knee  Pain Addressed 1: Reposition  Pain Rating Post-Intervention 1: 4/10    Social History:  Living Environment: Patient lives with their spouse and daughter in a single story home with number of outside stair(s): 3 B rails  Prior Level of Function: Prior to admission, patient was independent  Equipment Used at Home: none  DME owned (not currently used): rolling walker and single point cane  Assistance Upon Discharge: significant other and family    Objective:     Communicated with RN prior to session. Patient found HOB elevated with SCD, telemetry, pulse ox (continuous), peripheral IV upon PT entry to room.    General Precautions: Standard, fall   Orthopedic Precautions: LLE weight bearing as tolerated   Braces: N/A    Respiratory Status: Room air    Exams:  RLE ROM: WFL  RLE Strength: WFL    (In degrees) AROM PROM   L knee flexion 92  100   L knee extension Lacking 2 degrees  0    Limited 2/2 bandage   Cognitive: Patient is oriented to Person, Place, Time, Situation  Sensation:    -       Intact    Functional Mobility:  Gait belt applied - Yes  Bed Mobility  Supine to Sit: contact guard assistance for increased time   Transfers  Sit to Stand: contact guard assistance with rolling walker and performed x 2 trials to adjust RW  Bed to Chair: contact guard assistance with rolling walker using Step Transfer  Gait  Patient ambulated 200 with rolling walker and contact guard assistance. Pt demos step through gait pattern and no LOB. VC for proper cervical posture      Therapeutic Activities and Exercises:  Patient educated on role of acute care PT and PT POC, safety while in hospital including calling nurse for mobility, and call light usage.  Educated about importance of OOB mobility and remaining up in chair most of the day.  Educated on continued movement for decreased pain    AM-PAC 6 CLICK MOBILITY  Total Score:     Patient left up in chair with all lines intact, call button in reach, RN notified, and spouse and family present.    GOALS:   Multidisciplinary Problems       Physical Therapy Goals          Problem: Physical Therapy    Goal Priority Disciplines Outcome Goal Variances Interventions   Physical Therapy Goal     PT, PT/OT Progressing     Description: Pt will improve functional independence by performing:    Bed mobility: mod I  Sit to stand: mod I with rolling walker  Bed to chair: SBA with Stand Step  with rolling walker   Car Transfer: SBA with rolling walker  Ambulation x 150' feet with SBA and rolling walker  1 Step (Curb): SBA and rolling walker  3 Steps: SBA and B HR  left knee AROM flexion (in degrees): 90  left knee AROM extension (in degrees): 0   Independent with total knee HEP                          History:     Past Medical History:   Diagnosis Date    Arthritis     Cancer     Breast cancer-right    Digestive disorder      reflux    Hypertension        Past Surgical History:   Procedure Laterality Date    BREAST LUMPECTOMY Right     x1 lymph node removed    COLONOSCOPY      HYSTERECTOMY      partial    REPAIR OF MENISCUS OF KNEE Right     ROBOTIC ARTHROPLASTY, KNEE Right 12/1/2023    Procedure: ROBOTIC ARTHROPLASTY, KNEE, TOTAL;  Surgeon: Tomas Gordillo MD;  Location: [facility];  Service: Orthopedics;  Laterality: Right;       Time Tracking:     PT Received On:    PT Start Time: 1437  PT Stop Time: 1500  PT Total Time (min): 23 min     Billable Minutes: Evaluation 23    5/28/2024

## 2024-05-28 NOTE — ANESTHESIA PREPROCEDURE EVALUATION
05/28/2024  Collette G Cormier is a 58 y.o., female.  Procedure Information    Case: [id number] Date/Time: 05/28/24 1144   Procedure: ROBOTIC ARTHROPLASTY, KNEE, TOTAL (Left: Knee)   Anesthesia type: General   Diagnosis:      Osteoarthritis of left knee, unspecified osteoarthritis type [M17.12]      Preop testing [Z01.818]   Pre-op diagnosis:      Osteoarthritis of left knee, unspecified osteoarthritis type [M17.12]      Preop testing [Z01.818]   Location: [facility] OR  / [facility] OR   Surgeons: Tomas Gordillo MD       Pre-op Assessment    I have reviewed the Patient Summary Reports.     I have reviewed the Nursing Notes. I have reviewed the NPO Status.   I have reviewed the Medications.     Review of Systems  Anesthesia Hx:  No problems with previous Anesthesia                Hematology/Oncology:  Hematology Normal   Oncology Normal                                   EENT/Dental:  EENT/Dental Normal           Cardiovascular:  Exercise tolerance: good   Hypertension                Functional Capacity good / => 4 METS                         Pulmonary:  Pulmonary Normal                       Renal/:   Denies Chronic Renal Disease.                Hepatic/GI:  Hepatic/GI Normal                 Musculoskeletal:  Arthritis               Neurological:  Neurology Normal                                      Endocrine:  Endocrine Normal          Denies Morbid Obesity / BMI > 40  Dermatological:  Skin Normal    Psych:  Psychiatric Normal                    Physical Exam  General: Alert, Oriented, Well nourished and Cooperative    Airway:  Mallampati: II   Mouth Opening: Normal  TM Distance: Normal  Tongue: Normal  Neck ROM: Normal ROM    Dental:  Intact    Chest/Lungs:  Clear to auscultation, Normal Respiratory Rate    Heart:  Rate: Normal  Rhythm: Regular Rhythm       Latest Reference Range & Units 04/29/24 13:43   WBC 4.50  - 11.50 x10(3)/mcL 11.03   RBC 4.20 - 5.40 x10(6)/mcL 5.26   Hemoglobin 12.0 - 16.0 g/dL 13.8   Hematocrit 37.0 - 47.0 % 42.3   MCV 80.0 - 94.0 fL 80.4   MCH 27.0 - 31.0 pg 26.2 (L)   MCHC 33.0 - 36.0 g/dL 32.6 (L)   RDW 11.5 - 17.0 % 14.0   Platelet Count 130 - 400 x10(3)/mcL 281   MPV 7.4 - 10.4 fL 9.2   Neut % % 70.0   LYMPH % % 19.1   Mono % % 7.7   Eos % % 2.6   Basophil % % 0.4   Immature Granulocytes % 0.2   Neut # 2.1 - 9.2 x10(3)/mcL 7.72   Lymph # 0.6 - 4.6 x10(3)/mcL 2.11   Mono # 0.1 - 1.3 x10(3)/mcL 0.85   Eos # 0 - 0.9 x10(3)/mcL 0.29   Baso # <=0.2 x10(3)/mcL 0.04   Immature Grans (Abs) 0 - 0.04 x10(3)/mcL 0.02   nRBC % 0.0   Sodium 136 - 145 mmol/L 140   Potassium 3.5 - 5.1 mmol/L 4.1   Chloride 98 - 107 mmol/L 107   CO2 22 - 29 mmol/L 25   BUN 9.8 - 20.1 mg/dL 16.7   Creatinine 0.55 - 1.02 mg/dL 1.03 (H)   eGFR mls/min/1.73/m2 >60   Glucose 74 - 100 mg/dL 98   Calcium 8.4 - 10.2 mg/dL 10.1   ALP 40 - 150 unit/L 66   PROTEIN TOTAL 6.4 - 8.3 gm/dL 7.5   Albumin 3.5 - 5.0 g/dL 4.1   Albumin/Globulin Ratio 1.1 - 2.0 ratio 1.2   BILIRUBIN TOTAL <=1.5 mg/dL 0.5   AST 5 - 34 unit/L 18   ALT 0 - 55 unit/L 19   Globulin, Total 2.4 - 3.5 gm/dL 3.4   Hemoglobin A1C External <=7.0 % 5.5   Estimated Avg Glucose mg/dL 111.2   Color, UA Yellow, Light-Yellow, Dark Yellow, Celine, Straw  Yellow   Appearance, UA Clear  Clear   Specific Gravity,UA 1.005 - 1.030  1.025   pH, UA 5.0 - 8.5  6.0   Protein, UA Negative  Negative   Glucose, UA Negative, Normal  Negative   Ketones, UA Negative  Trace !   Blood, UA Negative  Negative   NITRITE UA Negative  Negative   Bilirubin, UA Negative  Negative   Urobilinogen, UA 0.2, 1.0, Normal  0.2   Leukocyte Esterase, UA Negative  Negative   (L): Data is abnormally low  (H): Data is abnormally high  !: Data is abnormalst Reason : M17.12,Z01.818,     Vent. Rate : 085 BPM     Atrial Rate : 085 BPM      P-R Int : 142 ms          QRS Dur : 088 ms       QT Int : 370 ms       P-R-T Axes :  036 -20 000 degrees      QTc Int : 440 ms     Normal sinus rhythm   Moderate voltage criteria for LVH, may be normal variant   Borderline Abnormal ECG   When compared with ECG of 29-FEB-2024 15:01,   No significant change was found   Confirmed by Dean Fay MD ([id number]) on 4/30/2024 9:54:50 AM       Anesthesia Plan  Type of Anesthesia, risks & benefits discussed:    Anesthesia Type: Spinal, Gen Natural Airway  Intra-op Monitoring Plan: Standard ASA Monitors  Post Op Pain Control Plan: IV/PO Opioids PRN and intrathecal opioid  Induction:  IV  Informed Consent: Informed consent signed with the Patient and all parties understand the risks and agree with anesthesia plan.  All questions answered. Patient consented to blood products? Yes  ASA Score: 2  Day of Surgery Review of History & Physical: H&P Update referred to the surgeon/provider.I have interviewed and examined the patient. I have reviewed the patient's H&P dated: There are no significant changes.     Ready For Surgery From Anesthesia Perspective.     .

## 2024-05-28 NOTE — PLAN OF CARE
Problem: Adult Inpatient Plan of Care  Goal: Plan of Care Review  Outcome: Progressing  Goal: Patient-Specific Goal (Individualized)  Outcome: Progressing  Goal: Absence of Hospital-Acquired Illness or Injury  Outcome: Progressing  Goal: Optimal Comfort and Wellbeing  Outcome: Progressing  Goal: Readiness for Transition of Care  Outcome: Progressing     Problem: Wound  Goal: Optimal Coping  Outcome: Progressing  Goal: Optimal Functional Ability  Outcome: Progressing  Goal: Absence of Infection Signs and Symptoms  Outcome: Progressing  Goal: Improved Oral Intake  Outcome: Progressing  Goal: Optimal Pain Control and Function  Outcome: Progressing  Goal: Skin Health and Integrity  Outcome: Progressing  Goal: Optimal Wound Healing  Outcome: Progressing     Problem: Infection  Goal: Absence of Infection Signs and Symptoms  Outcome: Progressing     Problem: Comorbidity Management  Goal: Blood Pressure in Desired Range  Outcome: Progressing

## 2024-05-28 NOTE — ADDENDUM NOTE
Addendum  created 05/28/24 1227 by Celine Garcia CRNA    Flowsheet accepted, Intraprocedure Flowsheets edited

## 2024-05-28 NOTE — ANESTHESIA POSTPROCEDURE EVALUATION
Anesthesia Post Evaluation    Patient: Collette G Cormier    Procedure(s) Performed: Procedure(s) (LRB):  ROBOTIC ARTHROPLASTY, KNEE, TOTAL (Left)    Final Anesthesia Type: general      Patient location during evaluation: PACU  Patient participation: Yes- Able to Participate  Level of consciousness: awake and alert and oriented  Post-procedure vital signs: reviewed and stable  Pain management: adequate  Airway patency: patent  FLY mitigation strategies: Verification of full reversal of neuromuscular block  PONV status at discharge: No PONV  Anesthetic complications: no      Cardiovascular status: blood pressure returned to baseline and stable  Respiratory status: spontaneous ventilation and unassisted  Hydration status: euvolemic  Follow-up not needed.  Comments: [facility]              Vitals Value Taken Time   BP 94/53 05/28/24 1136   Temp  05/28/24 1140   Pulse 63 05/28/24 1140   Resp 18 05/28/24 1140   SpO2 100 % 05/28/24 1140   Vitals shown include unfiled device data.      No case tracking events are documented in the log.      Pain/Celeste Score: Pain Rating Prior to Med Admin: 9 (5/28/2024  9:12 AM)

## 2024-05-28 NOTE — H&P
Admission History & Physical    Subjective:    CC: No chief complaint on file.       HPI:  Collette G Cormier presents today for preoperative evaluation for left total knee arthroplasty with Wagner robotic assistance.  Patient was scheduled to have this procedure done last month however due to insurance issues she had to push it back.  She denies any changes since her last visit.  She states she continues to have pain about the left knee with long standing, bending and walking.  She would like to proceed with rescheduling surgery today.  I reviewed the indications for surgery. The risks and benefits of the proposed and alternative treatments were discussed with the patient. Questions pertinent to the procedure were solicited and answered.  Patient was given instruction to call clinic with any further questions.No assurances were given. Informed consent was obtained. The patient expressed good understanding and wished to proceed with scheduling the procedure.     This patient has  Increased pain with activity Initiation  Interference with normal activities of daily living due to pain  Increased pain with weight bearing activities  Pain with range of motion      ROS:   Constitutional: No fever, weakness, or fatigue.   Ear/Nose/Mouth/Throat: No nasal congestion or sore throat.   Respiratory: No shortness of breath or cough.   Cardiovascular: No chest pain, palpitations, or peripheral edema.   Gastrointestinal: No nausea, vomiting, or abdominal pain.   Genitourinary: No dysuria.  Musculoskeletal:  Left knee pain, swelling, loss of motion.    Past Surgical History:   Procedure Laterality Date    BREAST LUMPECTOMY Right     x1 lymph node removed    COLONOSCOPY      HYSTERECTOMY      partial    REPAIR OF MENISCUS OF KNEE Right     ROBOTIC ARTHROPLASTY, KNEE Right 12/1/2023    Procedure: ROBOTIC ARTHROPLASTY, KNEE, TOTAL;  Surgeon: Tomas Gordillo MD;  Location: [facility];  Service: Orthopedics;  Laterality: Right;        Past  Medical History:   Diagnosis Date    Arthritis     Cancer     Breast cancer-right    Digestive disorder     reflux    Hypertension         Objective:    Vitals:    05/28/24 0842   Temp: 97.3 °F (36.3 °C)        Physical Exam:    Appearance: No distress, good color on room air. Alert and cooperative.  HEENT: Normocephalic. PERRLA EOM intact.   Lungs: Breathing unlabored.  Heart: Regular rate and rhythm.  Abdomen: Soft, non-tender.  No rebound tenderness.  Extremities: Examination of the left lower extremity compartments are soft and warm. Skin is intact. There are no signs or symptoms of DVT or infection. There is a mild joint effusion. There is no erythema. Tender to palpation along the anteromedial joint line , left knee range of motion is 5-105 degrees; varus deformity. The knee is stable to exam with varus and valgus stressing. Negative anterior and posterior drawer. Negative Lachman´s. Negative Kenna's test. Patella grind is positive, Negative for apprehension. Neurovascularly intact distally.   Skin: No rashes or open wounds.        Assessment:  1. Osteoarthritis of left knee, unspecified osteoarthritis type  - Case Request Operating Room: ROBOTIC ARTHROPLASTY, KNEE, TOTAL  - Vital signs; Standing  - Insert peripheral IV; Standing  - Clip and Prep Other (please specifiy) (Operative site); Standing  - Cleanse with Chlorhexidine (CHG); Standing  - Diet NPO; Standing  - electrolyte-A infusion  - IP VTE LOW RISK PATIENT; Standing  - ceFAZolin (ANCEF) 2 g in dextrose 5 % (D5W) 50 mL IVPB  - acetaminophen tablet 1,000 mg  - gabapentin capsule 300 mg  - ondansetron disintegrating tablet 4 mg  - scopolamine 1.3-1.5 mg (1 mg over 3 days) 1 patch  - tranexamic acid (LYSTEDA) tablet 1,950 mg  - POCT glucose; Standing  - CBC auto differential; Future  - Comprehensive metabolic panel; Future  - Urinalysis; Future  - X-Ray Chest PA And Lateral; Future  - EKG 12-lead; Future  - Inpatient consult to Anesthesiology;  Standing  - Place in Outpatient; Standing  - Place FRIDA hose; Standing  - Place sequential compression device; Standing  - Chlorohexidine Gluconate Bath; Standing  - Hemoglobin A1C; Future  - MRSA PCR; Future  - MRSA PCR    2. Preop testing  - Case Request Operating Room: ROBOTIC ARTHROPLASTY, KNEE, TOTAL  - Vital signs; Standing  - Insert peripheral IV; Standing  - Clip and Prep Other (please specifiy) (Operative site); Standing  - Cleanse with Chlorhexidine (CHG); Standing  - Diet NPO; Standing  - electrolyte-A infusion  - IP VTE LOW RISK PATIENT; Standing  - ceFAZolin (ANCEF) 2 g in dextrose 5 % (D5W) 50 mL IVPB  - acetaminophen tablet 1,000 mg  - gabapentin capsule 300 mg  - ondansetron disintegrating tablet 4 mg  - scopolamine 1.3-1.5 mg (1 mg over 3 days) 1 patch  - tranexamic acid (LYSTEDA) tablet 1,950 mg  - POCT glucose; Standing  - CBC auto differential; Future  - Comprehensive metabolic panel; Future  - Urinalysis; Future  - X-Ray Chest PA And Lateral; Future  - EKG 12-lead; Future  - Inpatient consult to Anesthesiology; Standing  - Place in Outpatient; Standing  - Place FRIDA hose; Standing  - Place sequential compression device; Standing  - Chlorohexidine Gluconate Bath; Standing  - Hemoglobin A1C; Future  - MRSA PCR; Future  - MRSA PCR       Plan:  Plan for left total knee arthroplasty with Wagner robotic assistance at [facility] on 05/28/2024. The patient has been given preoperative instructions. Post-operative appointment is scheduled for 2 weeks.

## 2024-05-28 NOTE — ADDENDUM NOTE
Addendum  created 05/28/24 1248 by Jonnathan Hastings MD    Child order released for a procedure order, Clinical Note Signed, Intraprocedure Blocks edited, SmartForm saved

## 2024-05-28 NOTE — OP NOTE
DATE OF PROCEDURE:   05/28/2024    SURGEON:  Tomas Gordillo M.D.    ASSISTANT: MELISSA Valdez     ASSISTANT ATTESTATION: PA was necessary and essential for all aspects of the operation, including but no limited to patient positioning, surgical exposure, bony preparation, implantation, wound closure, and dressing placement.      Hospital: [facility]     PREOPERATIVE DIAGNOSIS:  Arthritis, Left knee.     POSTOPERATIVE DIAGNOSIS:  Arthritis, Left knee.     PROCEDURES PERFORMED:  Robotically-assisted left total knee arthroplasty.     ANESTHESIA: spinal    IV FLUIDS: Per Anesthesia    ESTIMATED BLOOD LOSS:  < 50cc     COUNTS:  Correct.    COMPLICATIONS:  None.    IMPLANTS:   Implant Name Type Inv. Item Serial No.  Lot No. LRB No. Used Action   PIN BONE [id number] - [id number]  PIN BONE [id number]  [employer].  Left 1 Implanted and Explanted   PIN BONE 4 X 140MM STERILE - [id number]  PIN BONE 4 X 140MM STERILE  [employer]  Left 1 Implanted and Explanted   CEMENT BONE ANTIBIO SIMPLEX P - [id number]  CEMENT BONE ANTIBIO SIMPLEX P  VERONICA [employer] KRISTOPHER. [id number] Left 2 Implanted   PATELLA TRI 29X9 X3 POLYETHYLE - [id number]  PATELLA TRI 29X9 X3 POLYETHYLE  [employer] KRISTOPHER. [id number] Left 1 Implanted   cruciate retaining femoral    VERONICA [id number] Left 1 Implanted   tibial bearing insert cs    VERONICA [id number] Left 1 Implanted   BASEPLATE TIBIAL RADHA SZ 1 - [id number]  BASEPLATE TIBIAL RADHA SZ 1  [employer] KRI[employer]HER. [id number] Left 1 Implanted       CONDITION: Stable to PACU.        INDICATIONS FOR PROCEDURE:    Collette G Cormier is a 58 y.o. year old female with continued pain and loss of motion of the left knee. The patient has failed conservative treatments and has been followed in my clinic. The risks, benefits, and alternatives were discussed with the patient in detail. All questions were answered. Informed consent was obtained.       PROCEDURE IN  DETAIL:  Patient was found in preoperative holding by Anesthesia and found fit for surgery. The patient was taken to the operating room and placed on the operating table in supine position. All bony prominences were well padded. Timeout was called to identify correct patient, correct procedure, and correct site, and all were in agreement. The patient underwent spinal anesthesia without complications. The patient was then prepped and draped in normal sterile fashion, leaving the left lower extremity exposed for surgery. A well-padded tourniquet was placed on left upper thigh. Approximate tourniquet time was 42 minutes at 300. The patient received preoperative antibiotics.     After exsanguination of left lower extremity, tourniquet was inflated. A 15 cm anterior incision was made over the left knee, soft tissue dissection down to the fascia, where patient had a medial parapatellar arthrotomy. The knee was then flexed and patella was everted. Remaining fat pad and meniscus were removed. The patient had severe bone-on-bone arthritis of the medial compartment and patellofemoral joint. Patient had significant varus deformity.     Next, the 2 femoral and tibial pins were then placed to allow communication with the [employer] robotic machine. The knee was then registered with 40 trigger points both on the femoral and tibial side. Next, patient underwent soft tissue balancing, both mediolaterally in and flexion extension, evenly out to approximately 18 mm. After this was done, the [employer] robotic machine was brought in. The distal femoral cut, anterior, posterior, and chamfer cuts were then made. The proximal tibial cut was then made. Next, trialing with a 1 femur and a 1 tibia demonstrated 10 poly with full extension. The patient was able to gravity flex to 125 degrees of gravity flexion and the patella was also resurfaced with a 29 patella. After this was done, the trialing components were removed. The bone was then copiously  irrigated and dried. The actual components were then cemented into place. The knee was placed in extension. Tourniquet was released. Hemostasis was achieved. Copious irrigation was used to wash the wound. The patient's motion was 125 degrees of flexion. The patient was stable to stressing and patellofemoral tracking was appropriate.     After this was done, copious irrigation was used to wash the wound. The fascia was closed with 0 Ethibond, subcutaneous tissue closed with 2-0 Vicryl. Skin was closed with skin staples. Xeroform, 4 x 4's, soft tissue dressing, Ace wrap was placed over the left lower extremity. The patient was then awoken by Anesthesia and brought to PACU in stable condition.

## 2024-05-28 NOTE — ANESTHESIA PROCEDURE NOTES
Spinal    Diagnosis: OSTEOARTHRITIS LFT KNEE  Patient location during procedure: OR  Start time: 5/28/2024 9:41 AM  Timeout: 5/28/2024 9:40 AM  End time: 5/28/2024 9:42 AM    Staffing  Authorizing Provider: Jonnathan Hastings MD  Performing Provider: Jonnathan Hastings MD    Staffing  Performed by: Jonnathan Hastings MD  Authorized by: Jonnathan Hastnigs MD    Preanesthetic Checklist  Completed: patient identified, IV checked, site marked, risks and benefits discussed, surgical consent, monitors and equipment checked, pre-op evaluation and timeout performed  Spinal Block  Patient position: sitting  Prep: ChloraPrep  Patient monitoring: heart rate, cardiac monitor and continuous pulse ox  Location: L2-3  Injection technique: single shot  CSF Fluid: clear free-flowing CSF  Needle  Needle type: Quincke   Needle gauge: 25 G  Needle length: 3.5 in  Additional Documentation: incremental injection, negative aspiration for heme and no paresthesia on injection  Needle localization: anatomical landmarks  Assessment  Sensory level: T4   Dermatomal levels determined by pinch or prick  Ease of block: easy  Patient's tolerance of the procedure: comfortable throughout block and no complaints  Additional Notes  VSS BRIDGETTE PROCEDURE WELL,   Medications:    Medications: bupivacaine (pf) (MARCAINE) injection 0.75% - Intraspinal   1.8 mL - 5/28/2024 9:42:00 AM

## 2024-05-28 NOTE — ANESTHESIA POSTPROCEDURE EVALUATION
Anesthesia Post Evaluation    Patient: Collette G Cormier    Procedure(s) Performed: Procedure(s) (LRB):  ROBOTIC ARTHROPLASTY, KNEE, TOTAL (Left)    [facility] Anesthesia Post Op Evaluation      Vitals Value Taken Time   BP 94/53 05/28/24 1136   Temp  05/28/24 1136   Pulse 69 05/28/24 1136   Resp 17 05/28/24 1136   SpO2 100 % 05/28/24 1136   Vitals shown include unfiled device data.      No case tracking events are documented in the log.      Pain/Celeste Score: Pain Rating Prior to Med Admin: 9 (5/28/2024  9:12 AM)

## 2024-05-28 NOTE — ANESTHESIA POSTPROCEDURE EVALUATION
Anesthesia Post Evaluation    Patient: Collette G Cormier    Procedure(s) Performed: Procedure(s) (LRB):  ROBOTIC ARTHROPLASTY, KNEE, TOTAL (Left)    Final Anesthesia Type: spinal      Patient location during evaluation: floor  Patient participation: Yes- Able to Participate  Level of consciousness: awake and alert and oriented  Post-procedure vital signs: reviewed and stable  Pain management: adequate  Airway patency: patent    PONV status at discharge: No PONV, nausea (controlled) and vomiting (controlled)  Anesthetic complications: no      Cardiovascular status: blood pressure returned to baseline and stable  Respiratory status: unassisted    Comments: SENSORI-MOTOR INTACT              Vitals Value Taken Time   BP 97/60 05/28/24 1211   Temp 36.3 °C (97.3 °F) 05/28/24 1130   Pulse 63 05/28/24 1213   Resp 15 05/28/24 1213   SpO2 100 % 05/28/24 1213   Vitals shown include unfiled device data.      No case tracking events are documented in the log.      Pain/Celeste Score: Pain Rating Prior to Med Admin: 9 (5/28/2024  9:12 AM)  Celeste Score: 8 (5/28/2024 12:11 PM)

## 2024-05-28 NOTE — ANESTHESIA PROCEDURE NOTES
Peripheral Block/LFT ADDUCTOR CANAL BLK    Patient location during procedure: pre-op   Block not for primary anesthetic.  Reason for block: at surgeon's request and post-op pain management   Post-op Pain Location: lft knee   Start time: 5/28/2024 12:05 PM  Timeout: 5/28/2024 12:04 PM   End time: 5/28/2024 12:06 PM    Staffing  Authorizing Provider: Jonnathan Hastings MD  Performing Provider: Jonnathan Hastings MD    Staffing  Performed by: Jonnathan Hastings MD  Authorized by: Jonnathan Hastings MD    Preanesthetic Checklist  Completed: patient identified, IV checked, site marked, risks and benefits discussed, surgical consent, monitors and equipment checked, pre-op evaluation and timeout performed  Peripheral Block  Patient position: supine  Prep: ChloraPrep and site prepped and draped  Patient monitoring: heart rate, cardiac monitor, continuous pulse ox, continuous capnometry and frequent blood pressure checks  Block type: adductor canal  Laterality: left  Injection technique: single shot  Needle  Needle type: Stimuplex   Needle gauge: 22 G  Needle length: 4 in  Needle localization: anatomical landmarks and ultrasound guidance  Catheter type: spring wound  Catheter size: 19 G  Test dose: lidocaine 1.5% with Epi 1-to-200,000 and negative   -ultrasound image captured on disc.  Assessment  Injection assessment: negative aspiration, negative parasthesia and local visualized surrounding nerve  Paresthesia pain: none  Heart rate change: no  Slow fractionated injection: yes  Pain Tolerance: comfortable throughout block and no complaints  Medications:    Medications: bupivacaine (pf) (MARCAINE) injection 0.25% - Perineural   30 mL - 5/28/2024 12:05:00 PM    Additional Notes  VSS TOLERATED PROCEDURE WELL , SEE NURSES NOTES

## 2024-05-28 NOTE — BRIEF OP NOTE
[facility] - Periop Services  Brief Operative Note    SUMMARY     Surgery Date: 5/28/2024     Surgeons and Role:     * Tomas Gordillo MD - Primary    Assisting Surgeon: None    Pre-op Diagnosis:  Osteoarthritis of left knee, unspecified osteoarthritis type [M17.12]  Preop testing [Z01.818]    Post-op Diagnosis:  Post-Op Diagnosis Codes:     * Osteoarthritis of left knee, unspecified osteoarthritis type [M17.12]     * Preop testing [Z01.818]    Procedure(s) (LRB):  ROBOTIC ARTHROPLASTY, KNEE, TOTAL (Left)    Anesthesia: General    Implants:  Implant Name Type Inv. Item Serial No.  Lot No. LRB No. Used Action   CEMENT BONE ANTIBIO SIMPLEX P - [id number]  CEMENT BONE ANTIBIO SIMPLEX P  VERONICA [employer] KRISTOPHER. [id number] Left 2 Implanted   PATELLA TRI 29X9 X3 POLYETHYLE - [id number]  PATELLA TRI 29X9 X3 POLYETHYLE  [employer]ONICA [employer] KRISTOPHER. [id number] Left 1 Implanted   cruciate retaining femoral    VERONICA [id number] Left 1 Implanted   tibial bearing insert cs    VERONICA [id number] Left 1 Implanted   BASEPLATE TIBIAL RADHA SZ 1 - [id number]  BASEPLATE TIBIAL RADHA SZ 1  [employer]ONICA [employer] KRISTOPHER. [id number] Left 1 Implanted       Operative Findings: L TKA    Estimated Blood Loss: * No values recorded between 5/28/2024 10:12 AM and 5/28/2024 11:25 AM *    Estimated Blood Loss has been documented.         Specimens:   Specimen (24h ago, onward)       Start     Ordered    05/28/24 1038  Specimen to Pathology  RELEASE UPON ORDERING        References:    Click here for ordering Quick Tip   Question:  Release to patient  Answer:  Immediate    05/28/24 1038                    [id number]

## 2024-05-28 NOTE — TRANSFER OF CARE
"Anesthesia Transfer of Care Note    Patient: Collette G Cormier    Procedure(s) Performed: Procedure(s) (LRB):  ROBOTIC ARTHROPLASTY, KNEE, TOTAL (Left)    Patient location: PACU    Anesthesia Type: general    Transport from OR: Transported from OR on room air with adequate spontaneous ventilation    Post pain: adequate analgesia    Post assessment: no apparent anesthetic complications and tolerated procedure well    Post vital signs: stable    Level of consciousness: responds to stimulation, awake and alert    Nausea/Vomiting: no nausea/vomiting    Complications: none    Transfer of care protocol was followed      Last vitals: Visit Vitals  BP (!) 160/93   Pulse 85   Temp 36.3 °C (97.3 °F) (Tympanic)   Resp 20   Ht 5' 2" (1.575 m)   Wt 96.1 kg (211 lb 13.8 oz)   LMP  (LMP Unknown)   SpO2 99%   Breastfeeding No   BMI 38.75 kg/m²     "

## 2024-05-28 NOTE — PLAN OF CARE
Problem: Physical Therapy  Goal: Physical Therapy Goal  Description: Pt will improve functional independence by performing:    Bed mobility: mod I  Sit to stand: mod I with rolling walker  Bed to chair: SBA with Stand Step  with rolling walker   Car Transfer: SBA with rolling walker  Ambulation x 150' feet with SBA and rolling walker  1 Step (Curb): SBA and rolling walker  3 Steps: SBA and B HR  left knee AROM flexion (in degrees): 90  left knee AROM extension (in degrees): 0   Independent with total knee HEP     Outcome: Progressing

## 2024-05-29 VITALS
TEMPERATURE: 98 F | WEIGHT: 211.88 LBS | BODY MASS INDEX: 38.99 KG/M2 | SYSTOLIC BLOOD PRESSURE: 135 MMHG | OXYGEN SATURATION: 98 % | RESPIRATION RATE: 18 BRPM | HEIGHT: 62 IN | HEART RATE: 66 BPM | DIASTOLIC BLOOD PRESSURE: 81 MMHG

## 2024-05-29 LAB
ANION GAP SERPL CALC-SCNC: 6 MEQ/L
BUN SERPL-MCNC: 20.4 MG/DL (ref 9.8–20.1)
CALCIUM SERPL-MCNC: 9.5 MG/DL (ref 8.4–10.2)
CHLORIDE SERPL-SCNC: 111 MMOL/L (ref 98–107)
CO2 SERPL-SCNC: 24 MMOL/L (ref 22–29)
CREAT SERPL-MCNC: 0.84 MG/DL (ref 0.55–1.02)
CREAT/UREA NIT SERPL: 24
ERYTHROCYTE [DISTWIDTH] IN BLOOD BY AUTOMATED COUNT: 14.2 % (ref 11.5–17)
GFR SERPLBLD CREATININE-BSD FMLA CKD-EPI: >60 ML/MIN/1.73/M2
GLUCOSE SERPL-MCNC: 159 MG/DL (ref 74–100)
HCT VFR BLD AUTO: 35.1 % (ref 37–47)
HGB BLD-MCNC: 11.4 G/DL (ref 12–16)
MCH RBC QN AUTO: 26.8 PG (ref 27–31)
MCHC RBC AUTO-ENTMCNC: 32.5 G/DL (ref 33–36)
MCV RBC AUTO: 82.4 FL (ref 80–94)
NRBC BLD AUTO-RTO: 0 %
PLATELET # BLD AUTO: 247 X10(3)/MCL (ref 130–400)
PMV BLD AUTO: 10 FL (ref 7.4–10.4)
POTASSIUM SERPL-SCNC: 4 MMOL/L (ref 3.5–5.1)
RBC # BLD AUTO: 4.26 X10(6)/MCL (ref 4.2–5.4)
SODIUM SERPL-SCNC: 141 MMOL/L (ref 136–145)
WBC # SPEC AUTO: 17.5 X10(3)/MCL (ref 4.5–11.5)

## 2024-05-29 PROCEDURE — 25000003 PHARM REV CODE 250: Performed by: NURSE PRACTITIONER

## 2024-05-29 PROCEDURE — 25000003 PHARM REV CODE 250

## 2024-05-29 PROCEDURE — 25000003 PHARM REV CODE 250: Performed by: ORTHOPAEDIC SURGERY

## 2024-05-29 PROCEDURE — 80048 BASIC METABOLIC PNL TOTAL CA: CPT

## 2024-05-29 PROCEDURE — 97116 GAIT TRAINING THERAPY: CPT | Mod: CQ

## 2024-05-29 PROCEDURE — 96361 HYDRATE IV INFUSION ADD-ON: CPT

## 2024-05-29 PROCEDURE — 96366 THER/PROPH/DIAG IV INF ADDON: CPT

## 2024-05-29 PROCEDURE — 63600175 PHARM REV CODE 636 W HCPCS

## 2024-05-29 PROCEDURE — 36415 COLL VENOUS BLD VENIPUNCTURE: CPT

## 2024-05-29 PROCEDURE — 97530 THERAPEUTIC ACTIVITIES: CPT | Mod: CQ

## 2024-05-29 PROCEDURE — 85027 COMPLETE CBC AUTOMATED: CPT

## 2024-05-29 PROCEDURE — G0378 HOSPITAL OBSERVATION PER HR: HCPCS

## 2024-05-29 RX ORDER — POLYETHYLENE GLYCOL 3350 17 G/17G
17 POWDER, FOR SOLUTION ORAL 2 TIMES DAILY
Qty: 28 EACH | Refills: 0 | Status: SHIPPED | OUTPATIENT
Start: 2024-05-29 | End: 2024-06-12

## 2024-05-29 RX ORDER — DOXYCYCLINE 100 MG/1
100 CAPSULE ORAL EVERY 12 HOURS
Qty: 28 CAPSULE | Refills: 0 | Status: SHIPPED | OUTPATIENT
Start: 2024-05-29 | End: 2024-06-12

## 2024-05-29 RX ORDER — KETOROLAC TROMETHAMINE 10 MG/1
10 TABLET, FILM COATED ORAL EVERY 8 HOURS
Qty: 15 TABLET | Refills: 0 | Status: SHIPPED | OUTPATIENT
Start: 2024-05-29 | End: 2024-06-03

## 2024-05-29 RX ORDER — METHOCARBAMOL 750 MG/1
750 TABLET, FILM COATED ORAL EVERY 6 HOURS PRN
Qty: 56 TABLET | Refills: 0 | Status: SHIPPED | OUTPATIENT
Start: 2024-05-29 | End: 2024-06-12

## 2024-05-29 RX ORDER — HYDROCODONE BITARTRATE AND ACETAMINOPHEN 7.5; 325 MG/1; MG/1
1 TABLET ORAL EVERY 4 HOURS PRN
Qty: 42 TABLET | Refills: 0 | Status: SHIPPED | OUTPATIENT
Start: 2024-05-29 | End: 2024-06-13 | Stop reason: SDUPTHER

## 2024-05-29 RX ADMIN — METOCLOPRAMIDE 10 MG: 10 TABLET ORAL at 05:05

## 2024-05-29 RX ADMIN — DILTIAZEM HYDROCHLORIDE 360 MG: 180 CAPSULE, COATED, EXTENDED RELEASE ORAL at 06:05

## 2024-05-29 RX ADMIN — FAMOTIDINE 20 MG: 20 TABLET ORAL at 08:05

## 2024-05-29 RX ADMIN — ISOSORBIDE DINITRATE 20 MG: 20 TABLET ORAL at 08:05

## 2024-05-29 RX ADMIN — LISINOPRIL 20 MG: 10 TABLET ORAL at 08:05

## 2024-05-29 RX ADMIN — HYDRALAZINE HYDROCHLORIDE 35 MG: 25 TABLET, FILM COATED ORAL at 08:05

## 2024-05-29 RX ADMIN — CEFAZOLIN 2 G: 2 INJECTION, POWDER, FOR SOLUTION INTRAMUSCULAR; INTRAVENOUS at 03:05

## 2024-05-29 RX ADMIN — HYDROCODONE BITARTRATE AND ACETAMINOPHEN 1 TABLET: 7.5; 325 TABLET ORAL at 08:05

## 2024-05-29 RX ADMIN — ANASTROZOLE 1 MG: 1 TABLET, COATED ORAL at 08:05

## 2024-05-29 RX ADMIN — ASPIRIN 81 MG 81 MG: 81 TABLET ORAL at 08:05

## 2024-05-29 RX ADMIN — HYDROCODONE BITARTRATE AND ACETAMINOPHEN 1 TABLET: 7.5; 325 TABLET ORAL at 03:05

## 2024-05-29 RX ADMIN — DOCUSATE SODIUM 200 MG: 100 CAPSULE, LIQUID FILLED ORAL at 05:05

## 2024-05-29 RX ADMIN — SENNOSIDES AND DOCUSATE SODIUM 2 TABLET: 8.6; 5 TABLET ORAL at 08:05

## 2024-05-29 NOTE — PLAN OF CARE
Problem: Adult Inpatient Plan of Care  Goal: Plan of Care Review  Outcome: Progressing  Goal: Patient-Specific Goal (Individualized)  Outcome: Progressing  Goal: Absence of Hospital-Acquired Illness or Injury  Outcome: Progressing  Intervention: Identify and Manage Fall Risk  Flowsheets (Taken 5/28/2024 1929)  Safety Promotion/Fall Prevention:   assistive device/personal item within reach   medications reviewed   nonskid shoes/socks when out of bed  Intervention: Prevent Skin Injury  Flowsheets (Taken 5/28/2024 1929)  Body Position: weight shifting  Goal: Optimal Comfort and Wellbeing  Outcome: Progressing  Goal: Readiness for Transition of Care  Outcome: Progressing     Problem: Wound  Goal: Optimal Coping  Outcome: Progressing  Goal: Optimal Functional Ability  Outcome: Progressing  Goal: Absence of Infection Signs and Symptoms  Outcome: Progressing  Goal: Improved Oral Intake  Outcome: Progressing  Goal: Optimal Pain Control and Function  Outcome: Progressing  Goal: Skin Health and Integrity  Outcome: Progressing  Goal: Optimal Wound Healing  Outcome: Progressing     Problem: Infection  Goal: Absence of Infection Signs and Symptoms  Outcome: Progressing     Problem: Comorbidity Management  Goal: Blood Pressure in Desired Range  Outcome: Progressing

## 2024-05-29 NOTE — PLAN OF CARE
05/29/24 1035   Discharge Assessment   Assessment Type Discharge Planning Assessment   Source of Information patient   Does patient/caregiver understand observation status Yes   Communicated ANTONIO with patient/caregiver Yes   Reason For Admission s/p TKR   People in Home spouse   Do you expect to return to your current living situation? Yes   Do you have help at home or someone to help you manage your care at home? Yes   Who are your caregiver(s) and their phone number(s)?  - SHREYAS [phone]   Prior to hospitilization cognitive status: Alert/Oriented   Current cognitive status: Alert/Oriented   Walking or Climbing Stairs Difficulty yes   Walking or Climbing Stairs ambulation difficulty, requires equipment   Dressing/Bathing Difficulty yes   Dressing/Bathing bathing difficulty, requires equipment;dressing difficulty, requires equipment;dressing difficulty, assistance 1 person   Equipment Currently Used at Home walker, rolling   Readmission within 30 days? No   Patient currently being followed by outpatient case management? No   Do you currently have service(s) that help you manage your care at home? No   Do you take prescription medications? Yes   Do you have prescription coverage? Yes   Do you have any problems affording any of your prescribed medications? No   Is the patient taking medications as prescribed? yes   Who is going to help you get home at discharge?    How do you get to doctors appointments? car, drives self   Are you on dialysis? No   Do you take coumadin? No   Discharge Plan A Home with family   Discharge Plan B Home with family   DME Needed Upon Discharge  walker, rolling   Discharge Plan discussed with: Patient;Spouse/sig other   Transition of Care Barriers Mobility     S/p TKR. Spk w pt & Shreyas  ... Hsb & daughter to asst w homecare. Pt has RW. Provider list given. Foc obtained.   Called referral for outpatient therapy to [facility] They will contact pt with appt date & time.    PCP: dR. Paez    Contact # Blzo [phone]      Patient DID participate in a pre-op exercise regimen

## 2024-05-29 NOTE — PLAN OF CARE
Problem: Adult Inpatient Plan of Care  Goal: Plan of Care Review  Outcome: Met  Goal: Patient-Specific Goal (Individualized)  Outcome: Met  Goal: Absence of Hospital-Acquired Illness or Injury  Outcome: Met  Goal: Optimal Comfort and Wellbeing  Outcome: Met  Goal: Readiness for Transition of Care  Outcome: Met     Problem: Wound  Goal: Optimal Coping  Outcome: Met  Goal: Optimal Functional Ability  Outcome: Met  Goal: Absence of Infection Signs and Symptoms  Outcome: Met  Goal: Improved Oral Intake  Outcome: Met  Goal: Optimal Pain Control and Function  Outcome: Met  Goal: Skin Health and Integrity  Outcome: Met  Goal: Optimal Wound Healing  Outcome: Met     Problem: Infection  Goal: Absence of Infection Signs and Symptoms  Outcome: Met     Problem: Comorbidity Management  Goal: Blood Pressure in Desired Range  Outcome: Met

## 2024-05-29 NOTE — DISCHARGE INSTRUCTIONS
[facility]  [facility] [facility] [address]  [place] [address]  Phone [phone]       /      Fax [phone]  SURGEON: Dr. Gordillo    After discharge, all questions or concerns should be handled at your surgeon's office ([phone]). If it is a weekend or after hours, you will get the surgeon on call.     Discharge Medications:    PAIN MANAGEMENT: Next Dose Available   Toradol/Ketorolac 10mg (Anti-inflammatory) - take every 8 hours, around the clock, for the next 5 days 2 pm   Robaxin/Methocarbamol 750mg (Muscle Relaxer) - Every 6-8 hours AS NEEDED for muscle spasms, thigh pain or additional pain control When needed   Norco 7.5/325mg (Hydrocodone/Acetaminophen) (Pain Med) - every 4-6 hours AS NEEDED for pain 12:30 pm   COMPLICATION PREVENTION MEDS: Next Dose DUE   Aspirin 81mg twice a day for 6 weeks post-op for blood clot prevention PM on 5/29   MiraLAX 17gm - once or twice a day while on narcotics and muscle relaxers for constipation prevention PM on 5/29   Doxycycline 100mg (antibiotic) - twice a day for 2 weeks for infection prevention. PM on 5/29   NOZIN NASAL  - twice a day for 2 weeks or until supply runs out, whichever comes first (Infection prevention) PM on 5/29   Take a medication once or twice a day while taking TORADOL and Aspirin at the same time to prevent heartburn PM on 5/29     Total Knee Replacement                                                                                                                                    PAIN MEDICATIONS/PAIN MANAGEMENT: (Use the medication log in your discharge packet to keep track of your medications)  Toradol/Ketorolac 10mg (anti-inflammatory) - take every 8 hours around the clock for the next 5 days.   While on Toradol/Ketorolac and Aspirin (blood thinner) at the same time, take a medication once or twice a day to protect your stomach (for the next 5 days) (Examples: Nexium, Prilosec, Prevacid, Omeprazole,  Pepcid...etc). If you start having intolerable stomach issues, discontinue the Toradol completely.   Aside from Toradol, No other anti-inflammatories (Ibuprofen, Aleve, Motrin, Naproxen, Mobic/Meloxicam, Celebrex, Diclofenac/Voltaren...etc) for 2 weeks or until the wound is healed.      Norco 7.5/325mg (Hydrocodone/Acetaminophen) (pain pill) - You can take 1 tablet every 4-6 hours for pain. If the pain is mild, take 1/2 of a pill. Once you start taking a half of a pain pill, you can take a Tylenol 325mg with each dose for a little extra pain relief without side effects. Gradually decrease the use as the pain lessens. As you decrease the Norco, increase the Tylenol.    **NO MORE THAN 3000mg OF TYLENOL IN 24 HOURS**.     Robaxin/Methocarbamol 750mg (muscle relaxer)- you can take every 6-8 hours as needed for muscle spasms, thigh pain and stiffness, additional pain control or breakthrough pain medications. This medication is helpful for pain control while lessening your need for narcotics. Please reduce the use gradually as the pain and spasms lessen. DO NOT TAKE AT THE SAME TIME AS A PAIN PILL. YOU WILL BE BETTER SERVED WITH 2 HOURS BETWEEN PAIN PILL AND MUSCLE RELAXER.     **Other things that help with pain control is WALKING, COMPRESSION WRAP, ICE and ELEVATION!!**    BLOOD CLOT PREVENTION:   Aspirin 81mg (blood thinner) - twice a day for 6 weeks for blood clot prevention. Start on the evening of 5/29/24. Stop on 7/11/24.  If you were taking Baby Aspirin 81mg prior to surgery, may return to daily dose AFTER 6 weeks - 7/12/24.    You need to continuing wearing your compression stocking (PIYUSH Hose - ThromboEmbolic Disease Prevention Device) for the next 2-6 weeks post-op. It is ok to remove them for hygiene and at bedtime.   Hand wash and Dry. **If the swelling persists in the legs after you stop wearing the Piyush hose, continue to wear them until the swelling decreases.**  REMOVE STOCKINGS AT LEAST DAILY FOR SKIN  ASSESSMENT.   Do NOT let the stockings roll down, creating a tourniquet around the back of your knee. If you need to, leave the excess at the bottom of the stocking.   The best thing you can do to prevent blood clots is to walk around as much as possible, AT LEAST EVERY 1-2 HOURS.       CONSTIPATION PREVENTION:   Miralax or Senokot S/Blank-Colace and Stool softeners EVERY DAY while on pain meds.  Use other more aggressive over the counter LAXATIVES as needed for constipation (Examples: Milk of Magnesia, Dulcolax tabs or suppository, Magnesium Citrate, Fleet's Enema...etc.)   Drink lots of water.  Increase Fiber in diet.  Increase walking distance each day  DO NOT GO MORE THAN 2 DAYS WITHOUT HAVING A BOWEL MOVEMENT!    ACTIVITY:   Weight bearing precautions as follows:  FULL weight bearing to operative leg with walker.   DO NOT TAKE YOURSELF OFF OF THE WALKER TOO SOON. ALLOW YOUR OUTPATIENT THERAPIST or SURGEON TO GUIDE YOU.   Range of motion as tolerated. Work on BENDING and STRAIGHTENING your knee. Change positions often throughout the day. DO NOT PUT ANYTHING BEHIND THE KNEE KEEPING IT IN A BENT POSITION.   Elevate affected extremity way ABOVE THE LEVEL OF THE HEART to reduce swelling.  Walk around at least every 1-2 hours while awake.   No heavy lifting, pulling, pushing or straining.  Ice the Knee, thigh and lower leg AS MUCH AS POSSIBLE  Outpatient Physical Therapy - Bring prescription to clinic of choice as soon as possible.      WOUND CARE:     Dry dressing changes every other day and as needed for soiling for 14 days. Start FRIDAY.  You may need to change the dressing daily if the wound is draining. Switch to every other day as soon as possible. NOTIFY MD OF EXCESSIVE WOUND DRAINAGE.     DO NOT WET WOUND or apply any ointments, creams, lotions or antiseptics.  Ace wrap - apply your compression stocking and apply the ace wrap where the stocking stops for extra added compression to the knee.   May wet incision  AFTER 2 weeks- (after you follow-up with your surgeon).   Ok to shower before then if able to keep wound from getting wet (plastic barrier, saran wrap or cling wrap and tape).   DO NOT TOUCH INCISION      URINARY RETENTION:  If you start having difficulty urinating, decrease the use of Pain pills and muscle relaxers and notify your primary care doctor.     PNEUMONIA PREVENTION:  Stay out of bed as much as possible and walk around every 1-2 hours.  Continue breathing exercises (Incentive Spirometry) every 1-2 hours while mobility is limited and while you are on pain pills.    FALL PREVENTION:  Wear sturdy shoes that fit well - Wearing shoes with high heels or slippery soles, or shoes that are too loose, can lead to falls. Walking around in bare feet, or only socks, can also increase your risk of falling.  Use walker as long as your surgeon and therapist recommend it  Use good lighting and  throw rugs, electrical cords, furniture and clutter (anything than can cause you to trip at home.   Non-slip rug in bathroom or shower      INFECTION PREVENTION:  NOZIN ANTISEPTIC NASAL  - twice a day for 2 weeks or until supply runs out, whichever comes first. Shake bottle well, saturate cotton swab with 4 drops of antiseptic solution. Swab right nostril rim six times clockwise and counterclockwise. Take swab out, apply 2 more drops then swab left nostril rim six times clockwise and counterclockwise.   Doxycycline/Vibramycin 100mg (Antibiotic) - Take twice a day for 14 days post-op to prevent infection.   Proper handwashing before and after dressing changes. Do not wet the wound. Wound care instructions as written above. NOTIFY MD OF EXCESSIVE WOUND DRAINAGE.  No alcohol, smoking or tobacco products  Pets should not be allowed around the wound or the dressing.   Treat UTI and skin infections as soon as possible.  Pre-medicate with antibiotics prior to dental or surgical procedures.   If you are diabetic, MAINTAIN  GOOD BLOOD SUGAR CONTROL (Below 150) DURING YOUR RECOVERY. If you see high numbers, notify your primary care doctor.     Call your SURGEON'S OFFICE ([phone]) if you experience the following signs and symptoms of infection:   Unusual redness, swelling, excessive, cloudy or foul smelling drainage at the incision site.   Persistent low grade temp OR a temp greater than 102 F, unrelieved by Tylenol  Pain at surgical site, unrelieved by pain meds    Warning signs of a blood clot in your leg: (CALL YOUR SURGEON)  New onset or increasing pain in calf, new onset tenderness or redness above or below the knee or increasing swelling of your calf, ankle, or foot.  Warning signs that a blood clot has traveled to your lungs: (REPORT TO THE ER/CALL 380)  Sudden or increase in Shortness of breath, sudden onset of chest pains, or  Localized chest pain with coughing.       IF ANY ISSUES ARISE AND YOU FEEL THE NEED TO CALL YOUR PRIMARY CARE DOCTOR, PLEASE LET YOUR SURGEON KNOW AS WELL.     For emergencies, please report to OUR ([facility] or [facility] main [facility]) Emergency department and tell them to call YOUR SURGEON at [phone].     BEFORE MAKING ANY CHANGES TO THE MEDICAL CARE PLAN OR GOING TO THE EMERGENCY ROOM, PLEASE CONTACT THE SURGEON.    3rd floor nursing unit # [phone]  Use this number for questions about your discharge instructions or problems filling your discharge prescriptions.

## 2024-05-29 NOTE — PROGRESS NOTES
Patient discharged home/out patient PT with no acute distress noted. Discharge instructions/f/u/ rx given. Patient/family verbalized understanding all questions answered. Patient instructed to Notify MD with any questions/concerns that arise or report to ER with any emergencies.

## 2024-05-29 NOTE — PLAN OF CARE
Problem: Physical Therapy  Goal: Physical Therapy Goal  Description: Pt will improve functional independence by performing:    Bed mobility: mod I  Sit to stand: mod I with rolling walker-met  Bed to chair: SBA with Stand Step  with rolling walker-met  Car Transfer: SBA with rolling walker-met  Ambulation x 150' feet with SBA and rolling walker-met  1 Step (Curb): SBA and rolling walker-met  3 Steps: SBA and B HR-met  left knee AROM flexion (in degrees): 90-met  left knee AROM extension (in degrees): 0-met  Independent with total knee HEP     Outcome: Progressing

## 2024-05-29 NOTE — NURSING
Nurses Note -- 4 Eyes      5/28/2024   7:33 PM      Skin assessed during: Admit      [x] No Altered Skin Integrity Present    []Prevention Measures Documented      [] Yes- Altered Skin Integrity Present or Discovered   [] LDA Added if Not in Epic (Describe Wound)   [] New Altered Skin Integrity was Present on Admit and Documented in LDA   [] Wound Image Taken    Wound Care Consulted? No    Attending Nurse: Zamarripa rn  Second RN/Staff Member:   Violetta

## 2024-05-29 NOTE — PLAN OF CARE
Problem: Adult Inpatient Plan of Care  Goal: Absence of Hospital-Acquired Illness or Injury  Intervention: Identify and Manage Fall Risk  Flowsheets (Taken 5/28/2024 1929)  Safety Promotion/Fall Prevention:   assistive device/personal item within reach   medications reviewed   nonskid shoes/socks when out of bed  Intervention: Prevent Skin Injury  Flowsheets (Taken 5/28/2024 1929)  Body Position: weight shifting

## 2024-05-29 NOTE — PROGRESS NOTES
"Status post L TKA. POD # 1  No acute events overnight.    Pain controlled.    Resting in bed.   Ambulating with PT.    Vital Signs  Temp: 97.9 °F (36.6 °C)  Temp Source: Oral  Pulse: 66  Heart Rate Source: Monitor  Resp: 18  SpO2: 98 %  Pulse Oximetry Type: Intermittent  Flow (L/min) (Oxygen Therapy): 10  Oxygen Concentration (%): 80  Device (Oxygen Therapy): room air  BP: 135/81  BP Location: Left arm  BP Method: Automatic  Patient Position: Lying  Arousal Level: arouses to voice  Height and Weight  Height: 5' 2" (157.5 cm)  Weight: 96.1 kg (211 lb 13.8 oz)  Weight Method: Standard Scale  BSA (Calculated - sq m): 2.05 sq meters  BMI (Calculated): 38.7  Weight in (lb) to have BMI = 25: 136.4]    Lower extremity compartment soft and warm  No s/s of DVT or infection  Dressing c/d/i  NVI distally    Recent Lab Results         05/29/24  0435   05/28/24  1134   05/28/24  0908        Anion Gap 6.0           BUN 20.4           BUN/CREAT RATIO 24           Calcium 9.5           Chloride 111           CO2 24           Creatinine 0.84           eGFR >60           Glucose 159           Hematocrit 35.1   33.8         Hemoglobin 11.4   11.1         MCH 26.8           MCHC 32.5           MCV 82.4           MPV 10.0           nRBC 0.0           Platelet Count 247           POCT Glucose     104       Potassium 4.0           RBC 4.26           RDW 14.2           Sodium 141           WBC 17.50                   A/P:    Overall patient doing well.  Therapy for mobility and ambulation.  DVT Ppx  Finish abx   "

## 2024-05-29 NOTE — PT/OT/SLP PROGRESS
"Physical Therapy Treatment    Patient Name:  Collette G Cormier   MRN:  [id number]    Recommendations:     Discharge Recommendations: Low Intensity Therapy (outpaitent)  Discharge Equipment Recommendations: none  Barriers to discharge: None    Assessment:     Collette G Cormier is a 58 y.o. female admitted with a medical diagnosis of Osteoarthritis of left knee.  She presents with the following impairments/functional limitations: impaired functional mobility, gait instability, decreased ROM .    Rehab Prognosis: Good; patient would benefit from acute skilled PT services to address these deficits and reach maximum level of function.    Recent Surgery: Procedure(s) (LRB):  ROBOTIC ARTHROPLASTY, KNEE, TOTAL (Left) 1 Day Post-Op    Plan:     During this hospitalization, patient to be seen BID to address the identified rehab impairments via gait training, therapeutic activities, therapeutic exercises and progress toward the following goals:    Plan of Care Expires:  06/03/24    Subjective     Chief Complaint: n/a  Patient/Family Comments/goals: go home  Pain/Comfort:         Objective:     Communicated with rw prior to session.  Patient found supine with   upon PT entry to room.     General Precautions: Standard, fall  Orthopedic Precautions: LLE weight bearing as tolerated  Braces: N/A  Respiratory Status: Room air     Functional Mobility:  Bed Mobility:     Supine to Sit: modified independence  Transfers:     Sit to Stand:  modified independence with rolling walker  Bed to Chair: modified independence with  rolling walker  using  Step Transfer  Car Transfer: modified independence with  rolling walker  using  Step Transfer  Gait: pt amb >200ft w/rw and mod independence. Pt amb with normal gait speed, step through gait pattern.   Stairs:  Pt ascended/descended 6 stair(s) and 4" curb step with Rolling Walker with bilateral handrails with Modified Independent.       (In degrees) AROM PROM   L knee flexion 95    L knee " extension 0         Treatment & Education:  Pt preformed seated ex 10x in chair. Pt educated on HEP and importance of frequent mobility.     Patient left up in chair with all lines intact and call button in reach..    GOALS:   Multidisciplinary Problems       Physical Therapy Goals          Problem: Physical Therapy    Goal Priority Disciplines Outcome Goal Variances Interventions   Physical Therapy Goal     PT, PT/OT Progressing     Description: Pt will improve functional independence by performing:    Bed mobility: mod I  Sit to stand: mod I with rolling walker-met  Bed to chair: SBA with Stand Step  with rolling walker-met  Car Transfer: SBA with rolling walker-met  Ambulation x 150' feet with SBA and rolling walker-met  1 Step (Curb): SBA and rolling walker-met  3 Steps: SBA and B HR-met  left knee AROM flexion (in degrees): 90-met  left knee AROM extension (in degrees): 0-met  Independent with total knee HEP                          Time Tracking:     PT Received On:    PT Start Time: 0830     PT Stop Time: 0857  PT Total Time (min): 27 min     Billable Minutes: Gait Training 10 and Therapeutic Activity 17    Treatment Type: Treatment  PT/PTA: PTA     Number of PTA visits since last PT visit: 1 05/29/2024

## 2024-05-30 NOTE — DISCHARGE SUMMARY
ADMIT DATE: 5/28/2024    DISCHARGE DATE: 5/29/2024 10:49 AM     DIAGNOSIS:  End-stage osteoarthritis, left knee.      OPERATIVE PROCEDURE:  Left total knee arthroplasty.      HOSPITAL COURSE: Collette G Cormier is a 58 y.o.  old female who was admitted on the above date for the above procedure.  The patient tolerated surgery well, was back up to the orthopedic floor in stable condition.  Postoperatively, the patient is eating well, having normal bowel movements.  Pain is well controlled with p.o. pain medicine.  The patient has been followed by physical therapy, goals met.      PHYSICAL EXAM:  GENERAL:  The patient is a well nourished, well developed female.  The patient is awake, alert and oriented x3 and in no apparent distress.  The patient is pleasant and cooperative.   EXTREMITIES:  Examination of the left lower extremity compartments soft and warm.  Skin is intact.  No signs or symptoms of DVT or infection.  The incision is clean, dry, and intact.  The patient's motion is 0-100 degrees.  Stable to stressing and neurovascularly intact distally.      DISPOSITION:  Home with Outpatient Physical Therapy.      FOLLOWUP:     Follow-up Information       Tomas Gordillo MD. Go on 6/13/2024.    Specialty: Orthopedic Surgery  Why: Follow up with [facility] on 6/13/24 @ 1:00 pm  Contact information:  [address]  [address]  [place] [address]  [phone]               [facility] Follow up.    Specialties: Occupational Therapy, Physical Therapy  Why: This is the outpatient therapy facility. They will contact pt with appt date & time. Call if you have questions or concerns.  Contact information:  [address]  [place] [address]  [phone]                                 MEDICATIONS:    Reconciled Home Medications:      Medication List        START taking these medications      doxycycline 100 MG Cap  Commonly known as: VIBRAMYCIN  Take 1 capsule (100 mg  total) by mouth every 12 (twelve) hours. for 14 days     HYDROcodone-acetaminophen 7.5-325 mg per tablet  Commonly known as: NORCO  Take 1 tablet by mouth every 4 (four) hours as needed for Pain.     ketorolac 10 mg tablet  Commonly known as: TORADOL  Take 1 tablet (10 mg total) by mouth every 8 (eight) hours. for 5 days     methocarbamoL 750 MG Tab  Commonly known as: ROBAXIN  Take 1 tablet (750 mg total) by mouth every 6 (six) hours as needed (muscle spasms).     polyethylene glycol 17 gram Pwpk  Commonly known as: GLYCOLAX  Take 17 g by mouth 2 (two) times daily. Constipation PREVENTION for 14 days            CONTINUE taking these medications      anastrozole 1 mg Tab  Commonly known as: ARIMIDEX  Take 1 mg by mouth once daily.     benazepriL 20 MG tablet  Commonly known as: LOTENSIN  Take 20 mg by mouth once daily.     cholecalciferol (vitamin D3) 1,250 mcg (50,000 unit) capsule  Take 50,000 Units by mouth every 7 days.     diltiaZEM 360 MG 24 hr capsule  Commonly known as: CARDIZEM CD  Take 360 mg by mouth every morning.     fluticasone propionate 50 mcg/actuation nasal spray  Commonly known as: FLONASE  1 spray by Each Nostril route as needed.     hydrALAZINE 25 MG tablet  Commonly known as: APRESOLINE  Take 37.5 mg by mouth 2 (two) times daily.     isosorbide dinitrate 20 MG tablet  Commonly known as: ISORDIL  Take 20 mg by mouth 2 (two) times daily.     omeprazole 40 MG capsule  Commonly known as: PRILOSEC  Take 1 capsule every day by oral route at bedtime for 90 days, for GERD.     WEGOVY 0.25 mg/0.5 mL Pnij  Generic drug: semaglutide (weight loss)  Inject 0.5 mLs into the skin every 7 days.                   DISCHARGE INSTRUCTIONS:  Patient instructed to remain weightbearing as tolerated to the left lower extremity.  Call or return to the emergency room immediately if any worsening conditions.  Patient voiced understanding.

## 2024-05-31 LAB — VIEW PATHOLOGY REPORT (RELIAPATH): NORMAL

## 2024-06-13 ENCOUNTER — HOSPITAL ENCOUNTER (OUTPATIENT)
Dept: RADIOLOGY | Facility: CLINIC | Age: 59
Discharge: HOME | End: 2024-06-13
Payer: COMMERCIAL

## 2024-06-13 ENCOUNTER — OFFICE VISIT (OUTPATIENT)
Dept: ORTHOPEDICS | Facility: CLINIC | Age: 59
End: 2024-06-13
Payer: COMMERCIAL

## 2024-06-13 VITALS
SYSTOLIC BLOOD PRESSURE: 96 MMHG | BODY MASS INDEX: 38.99 KG/M2 | HEART RATE: 87 BPM | DIASTOLIC BLOOD PRESSURE: 69 MMHG | HEIGHT: 62 IN | WEIGHT: 211.88 LBS

## 2024-06-13 DIAGNOSIS — M17.12 OSTEOARTHRITIS OF LEFT KNEE, UNSPECIFIED OSTEOARTHRITIS TYPE: Primary | ICD-10-CM

## 2024-06-13 DIAGNOSIS — M17.12 OSTEOARTHRITIS OF LEFT KNEE, UNSPECIFIED OSTEOARTHRITIS TYPE: ICD-10-CM

## 2024-06-13 PROCEDURE — 4010F ACE/ARB THERAPY RXD/TAKEN: CPT | Mod: CPTII,,,

## 2024-06-13 PROCEDURE — 99024 POSTOP FOLLOW-UP VISIT: CPT | Mod: ,,,

## 2024-06-13 PROCEDURE — 73562 X-RAY EXAM OF KNEE 3: CPT | Mod: LT,,,

## 2024-06-13 PROCEDURE — 1160F RVW MEDS BY RX/DR IN RCRD: CPT | Mod: CPTII,,,

## 2024-06-13 PROCEDURE — 1159F MED LIST DOCD IN RCRD: CPT | Mod: CPTII,,,

## 2024-06-13 PROCEDURE — 3074F SYST BP LT 130 MM HG: CPT | Mod: CPTII,,,

## 2024-06-13 PROCEDURE — 3078F DIAST BP <80 MM HG: CPT | Mod: CPTII,,,

## 2024-06-13 PROCEDURE — 3044F HG A1C LEVEL LT 7.0%: CPT | Mod: CPTII,,,

## 2024-06-13 RX ORDER — HYDROCODONE BITARTRATE AND ACETAMINOPHEN 7.5; 325 MG/1; MG/1
1 TABLET ORAL EVERY 6 HOURS PRN
Qty: 28 TABLET | Refills: 0 | Status: SHIPPED | OUTPATIENT
Start: 2024-06-13 | End: 2024-06-20

## 2024-06-13 NOTE — PROGRESS NOTES
Subjective:    CC: Post-op Evaluation (PO L TKA 5/28/24 states she is doing good. Says she does still have a little throbbing pain. PT 3x a week which she finds is helping. Mobility has improved. )       HPI:  Patient returns to clinic for follow up of a left total knee arthroplasty on 5/28/24. Approximately 2 weeks out. The patient states she is doing well. Denies signs of infection. The patient is working with a physical therapist and making good progress.  Taking ASA 81 mg once daily.  Ambulating unassisted. No new complaints.    ROS: Refer to HPI for pertinent ROS. All other 12 point systems negative.    Objective:    Vitals:    06/13/24 1314   BP: 96/69   Pulse: 87        Physical Exam:  Left lower extremity compartments are soft and warm.  There are no signs or symptoms of DVT or infection. Incisions are well-healed. Staples have been removed and steri strips applied. Knee ROM is 0-90 degrees today.  The patient is appropriately tender to palpation. The patella is tracking appropriately. The knee is stable to stressing. Neurovascularly intact distally.          Images: X-rays: 3 views of the left knee demonstrate a well aligned total knee arthroplasty without evidence of loosening or infection. Images Reviewed and discussed with patient.    Assessment:  1. Osteoarthritis of left knee, unspecified osteoarthritis type  - HYDROcodone-acetaminophen (NORCO) 7.5-325 mg per tablet; Take 1 tablet by mouth every 6 (six) hours as needed for Pain.  Dispense: 28 tablet; Refill: 0       Plan:  Physical exam, x-rays, and intraoperative findings were discussed with the patient. Wound care instructions given.  Patient was instructed to take ASA 81 mg twice daily x4 more weeks.  The Patient was instructed to take pain medication as needed with appropriate precautions and continue PT to gain strength and ROM. I would like to see the patient back in 4 weeks to assess the patients progress.    Follow up: Follow up in about 4  weeks (around 7/11/2024).

## 2024-07-02 ENCOUNTER — TELEPHONE (OUTPATIENT)
Dept: ORTHOPEDICS | Facility: CLINIC | Age: 59
End: 2024-07-02
Payer: COMMERCIAL

## 2024-07-02 NOTE — LETTER
[facility]  [address] [place] [address] [address]  [place], [address]  Phone: [phone]  Fax: [phone]    Name:Collette G Cormier  :1965   Date:2024     PATIENT IS UNABLE TO WORK AS OF: 24  [_] Pending treatment.  [_] For approximately [_] Days [_] Weeks [_] Months  [_] Pending diagnostic testing.  [xx] Pending surgical treatment.  [_] For approximately _ months (Post Surgery)    PATIENT IS ABLE TO RETURN TO WORK AS OF: re-eval at next appt on 24      COMMENTS         Tomas Gordillo MD / Nava Rico PA-C

## 2024-07-02 NOTE — TELEPHONE ENCOUNTER
Patient called requesting that I fax her op report, post op note, and work status to her disability due to them not receiving it. She stated that someone new has started at her disability office so she asked that I send them the papers.     I stated that I would fax everything over to her [phone]

## 2024-07-11 ENCOUNTER — OFFICE VISIT (OUTPATIENT)
Dept: ORTHOPEDICS | Facility: CLINIC | Age: 59
End: 2024-07-11
Payer: COMMERCIAL

## 2024-07-11 VITALS — BODY MASS INDEX: 38.99 KG/M2 | WEIGHT: 211.88 LBS | HEIGHT: 62 IN

## 2024-07-11 DIAGNOSIS — M17.12 OSTEOARTHRITIS OF LEFT KNEE, UNSPECIFIED OSTEOARTHRITIS TYPE: Primary | ICD-10-CM

## 2024-07-11 PROCEDURE — 4010F ACE/ARB THERAPY RXD/TAKEN: CPT | Mod: CPTII,,,

## 2024-07-11 PROCEDURE — 99024 POSTOP FOLLOW-UP VISIT: CPT | Mod: ,,,

## 2024-07-11 PROCEDURE — 1159F MED LIST DOCD IN RCRD: CPT | Mod: CPTII,,,

## 2024-07-11 PROCEDURE — 3044F HG A1C LEVEL LT 7.0%: CPT | Mod: CPTII,,,

## 2024-07-11 PROCEDURE — 1160F RVW MEDS BY RX/DR IN RCRD: CPT | Mod: CPTII,,,

## 2024-07-11 NOTE — PROGRESS NOTES
Subjective:    CC: Follow-up of the Left Knee (L TKA 5/28/24 - 8/26/24, reports improvement, has minimal pain, still doing pt and its going well, noticing a difference, wbat, )       HPI:  Patient returns to clinic for follow up of a left total knee arthroplasty 5/28/24. Approximately 6 weeks out. The patient states she is overall improving.  She she does still have some pain but is overall minimal and well managed with ibuprofen. Denies signs of infection. The patient is working with a physical therapist and making good progress. Ambulating unassisted. No new complaints.    ROS: Refer to HPI for pertinent ROS. All other 12 point systems negative.    Objective:    There were no vitals filed for this visit.     Physical Exam:  Left lower extremity compartments are soft and warm.  There are no signs or symptoms of DVT or infection. Incisions are well-healed.  Knee ROM is 0-110 degrees today.  The patient is appropriately tender to palpation. The patella is tracking appropriately. The knee is stable to stressing. Neurovascularly intact distally.          Images:  Previous Images Reviewed and discussed with patient.    Assessment:  1. Osteoarthritis of left knee, unspecified osteoarthritis type       Plan:  Physical exam, previous x-rays, and intraoperative findings were discussed with the patient.  She is overall improving.  I have again discussed the importance of continuing physical therapy both at home and in the formal setting to gain full flexion and strength.  The Patient was instructed to take OTC pain medication as needed with appropriate precautions. I would like to see the patient back in 6 weeks to assess the patients progress.    Follow up: Follow up in about 6 weeks (around 8/22/2024).

## 2024-08-08 ENCOUNTER — TELEPHONE (OUTPATIENT)
Dept: ORTHOPEDICS | Facility: CLINIC | Age: 59
End: 2024-08-08
Payer: COMMERCIAL

## 2024-08-21 ENCOUNTER — TELEPHONE (OUTPATIENT)
Dept: ORTHOPEDICS | Facility: CLINIC | Age: 59
End: 2024-08-21
Payer: COMMERCIAL

## 2024-08-21 NOTE — TELEPHONE ENCOUNTER
Patient called stating that she had to reschedule her appt due to having gall bladder removed tomorrow. Advised that I will update her paperwork and send it in tomorrow once it is signed.     Pt verbalized understanding and will call with any questions or concerns.

## 2024-09-05 ENCOUNTER — OFFICE VISIT (OUTPATIENT)
Dept: ORTHOPEDICS | Facility: CLINIC | Age: 59
End: 2024-09-05
Payer: COMMERCIAL

## 2024-09-05 VITALS — BODY MASS INDEX: 38.99 KG/M2 | WEIGHT: 211.88 LBS | HEIGHT: 62 IN

## 2024-09-05 DIAGNOSIS — M17.12 OSTEOARTHRITIS OF LEFT KNEE, UNSPECIFIED OSTEOARTHRITIS TYPE: Primary | ICD-10-CM

## 2024-09-05 PROCEDURE — 3008F BODY MASS INDEX DOCD: CPT | Mod: CPTII,,,

## 2024-09-05 PROCEDURE — 4010F ACE/ARB THERAPY RXD/TAKEN: CPT | Mod: CPTII,,,

## 2024-09-05 PROCEDURE — 1160F RVW MEDS BY RX/DR IN RCRD: CPT | Mod: CPTII,,,

## 2024-09-05 PROCEDURE — 99213 OFFICE O/P EST LOW 20 MIN: CPT | Mod: ,,,

## 2024-09-05 PROCEDURE — 1159F MED LIST DOCD IN RCRD: CPT | Mod: CPTII,,,

## 2024-09-05 PROCEDURE — 3044F HG A1C LEVEL LT 7.0%: CPT | Mod: CPTII,,,

## 2024-09-05 NOTE — PROGRESS NOTES
Subjective:    CC: Follow-up of the Left Knee (L TKA 5/28/24 - pt states that her knee is getting better. She is still going to PT. Strength needs more work. )       HPI:  Patient returns to clinic for follow up of a left total knee arthroplasty. Just over 3 months out. The patient states her knee is getting much better.  She continues to work on gaining flexion in therapy as well as strength.  Pain well managed with occasional ibuprofen.  Denies signs of infection. The patient is working with a physical therapist and making good progress. Ambulating unassisted. No new complaints.    ROS: Refer to HPI for pertinent ROS. All other 12 point systems negative.    Objective:    There were no vitals filed for this visit.     Physical Exam:  Left lower extremity compartments are soft and warm.  There are no signs or symptoms of DVT or infection. Incisions are well-healed. Knee ROM is 0-115 degrees today.  The patient is mildly tender to palpation about the anterior lateral knee aspect. The patella is tracking appropriately. The knee is stable to stressing. Neurovascularly intact distally.          Images: X-rays: 3 views of the previous knee demonstrate a well aligned total knee arthroplasty without evidence of loosening or infection. Images Reviewed and discussed with patient.    Assessment:  1. Osteoarthritis of left knee, unspecified osteoarthritis type       Plan:  Physical exam, previous x-rays, and intraoperative findings were discussed with the patient.  She is overall progressing nicely.  She continues to have some flexion to gain but is progressing slowly.  Continue formal physical therapy to work on range of motion and strength.  The Patient was instructed to take OTC pain medication as needed with appropriate precautions.  We have discussed her work duties; she will remain out of work at this time as she does not feel safe to return.  I would like to see the patient back in 3 months to assess the patients  progress.    Follow up: Follow up in about 3 months (around 12/5/2024).

## 2024-09-05 NOTE — LETTER
[facility]  [address] [place] [address] [address], [address]  Phone: [phone]  Fax: [phone]    Name:Collette G Cormier  :1965   Date:2024     PATIENT IS UNABLE TO WORK AS OF: 24  [_] Pending treatment.  [x] For approximately [_] Days [_] Weeks [3] Months  [_] Pending diagnostic testing.  [_] Pending surgical treatment.  [_] For approximately _ months (Post Surgery)    PATIENT IS ABLE TO RETURN TO WORK AS OF: re-eval at next appt on 24      COMMENTS         MELISSA Valdez

## 2024-09-10 ENCOUNTER — TELEPHONE (OUTPATIENT)
Dept: ORTHOPEDICS | Facility: CLINIC | Age: 59
End: 2024-09-10
Payer: COMMERCIAL

## 2024-09-10 NOTE — TELEPHONE ENCOUNTER
Patient called in regards to her disability papers.     Attempted to call patient. No answer. Unable to leave .

## 2024-11-21 ENCOUNTER — OFFICE VISIT (OUTPATIENT)
Dept: ORTHOPEDICS | Facility: CLINIC | Age: 59
End: 2024-11-21
Payer: COMMERCIAL

## 2024-11-21 VITALS — WEIGHT: 211.88 LBS | HEIGHT: 62 IN | BODY MASS INDEX: 38.99 KG/M2

## 2024-11-21 DIAGNOSIS — M17.12 OSTEOARTHRITIS OF LEFT KNEE, UNSPECIFIED OSTEOARTHRITIS TYPE: Primary | ICD-10-CM

## 2024-11-21 PROCEDURE — 3008F BODY MASS INDEX DOCD: CPT | Mod: CPTII,,,

## 2024-11-21 PROCEDURE — 1159F MED LIST DOCD IN RCRD: CPT | Mod: CPTII,,,

## 2024-11-21 PROCEDURE — 1160F RVW MEDS BY RX/DR IN RCRD: CPT | Mod: CPTII,,,

## 2024-11-21 PROCEDURE — 99213 OFFICE O/P EST LOW 20 MIN: CPT | Mod: ,,,

## 2024-11-21 PROCEDURE — 4010F ACE/ARB THERAPY RXD/TAKEN: CPT | Mod: CPTII,,,

## 2024-11-21 PROCEDURE — 3044F HG A1C LEVEL LT 7.0%: CPT | Mod: CPTII,,,

## 2024-11-21 NOTE — LETTER
[facility]  [address] [place] [address] [address], [address]  Phone: [phone]  Fax: [phone]    Name:Collette G Cormier  :1965   Date:2024       PATIENT IS ABLE TO RETURN TO WORK AS OF: 12/3/24    [x] REGULAR DUTY: [x] No Restrictions. [_] With Restrictions (See comments below):    COMMENTS     Tomas Gordillo MD / Nava Rico PA-C

## 2024-11-21 NOTE — PROGRESS NOTES
Subjective:    CC: Follow-up of the Left Knee (L TKA 5/28/24 - pt states that her knee is doing great. Denies any pain. )       HPI:  Patient returns to clinic for follow up of a left total knee arthroplasty on 5/28/24. Approximately 6 months out. The patient states she is doing great.  She has no pain or complaints today. Denies signs of infection. The patient is working with a physical therapist and making good progress.  Working on strengthening.  Ambulating unassisted. No new complaints.    ROS: Refer to HPI for pertinent ROS. All other 12 point systems negative.    Objective:    There were no vitals filed for this visit.     Physical Exam:  Left lower extremity compartments are soft and warm.  There are no signs or symptoms of DVT or infection. Incisions are well-healed.  Knee ROM is 0-125 degrees today.  The patient is appropriately tender to palpation. The patella is tracking appropriately. The knee is stable to stressing. Neurovascularly intact distally.          Images: previous Images Reviewed and discussed with patient.    Assessment:  1. Osteoarthritis of left knee, unspecified osteoarthritis type       Plan:  Physical exam, previous x-rays, and intraoperative findings were discussed with the patient.  Patient is doing very well today.  She will finish out formal physical therapy.  The Patient was instructed to take OTC pain medication as needed with appropriate precautions.  We have discussed her work duties; she will return full duty on December 3, 2024. I would like to see the patient back in 6 months to assess the patients progress.    Follow up: Follow up in about 6 months (around 5/21/2025).

## 2025-06-30 ENCOUNTER — OFFICE VISIT (OUTPATIENT)
Dept: ORTHOPEDICS | Facility: CLINIC | Age: 60
End: 2025-06-30
Payer: COMMERCIAL

## 2025-06-30 ENCOUNTER — HOSPITAL ENCOUNTER (OUTPATIENT)
Dept: RADIOLOGY | Facility: CLINIC | Age: 60
Discharge: HOME | End: 2025-06-30
Payer: COMMERCIAL

## 2025-06-30 VITALS — BODY MASS INDEX: 38.99 KG/M2 | WEIGHT: 211.88 LBS | HEIGHT: 62 IN

## 2025-06-30 DIAGNOSIS — Z96.652 HISTORY OF TOTAL KNEE REPLACEMENT, LEFT: ICD-10-CM

## 2025-06-30 DIAGNOSIS — Z96.652 HISTORY OF TOTAL KNEE REPLACEMENT, LEFT: Primary | ICD-10-CM

## 2025-06-30 PROCEDURE — 73562 X-RAY EXAM OF KNEE 3: CPT | Mod: LT,,,

## 2025-06-30 PROCEDURE — 99213 OFFICE O/P EST LOW 20 MIN: CPT | Mod: ,,,

## 2025-06-30 PROCEDURE — 1159F MED LIST DOCD IN RCRD: CPT | Mod: CPTII,,,

## 2025-06-30 PROCEDURE — 4010F ACE/ARB THERAPY RXD/TAKEN: CPT | Mod: CPTII,,,

## 2025-06-30 PROCEDURE — 1160F RVW MEDS BY RX/DR IN RCRD: CPT | Mod: CPTII,,,

## 2025-06-30 PROCEDURE — 3008F BODY MASS INDEX DOCD: CPT | Mod: CPTII,,,

## 2025-06-30 NOTE — PROGRESS NOTES
Subjective:    CC: Follow-up of the Left Knee (L TKA 5/28/24- pt states that her knee is doing great. )       History of Present Illness    HPI:  Patient returns to clinic for repeat evaluation of left knee s/p total knee arthroplasty performed on 5/28/24, approximately one year ago. Her knee is functioning well and she has no complaints. She denies any significant problems when questioned. Her recovery and progress are noted to be excellent. She denies any pain with knee movement.    PREVIOUS TREATMENTS:  Patient underwent a left total knee arthroplasty on 5/28/24, which provided significant benefit.          ROS: Refer to HPI for pertinent ROS. All other 12 point systems negative.    Past Medical History:   Diagnosis Date    Arthritis     Cancer     Breast cancer-right    Digestive disorder     reflux    Hypertension         Past Surgical History:   Procedure Laterality Date    BREAST LUMPECTOMY Right     x1 lymph node removed    COLONOSCOPY      HYSTERECTOMY      partial    REPAIR OF MENISCUS OF KNEE Right     ROBOTIC ARTHROPLASTY, KNEE Right 12/1/2023    Procedure: ROBOTIC ARTHROPLASTY, KNEE, TOTAL;  Surgeon: Tomas Gordillo MD;  Location: [facility] OR;  Service: Orthopedics;  Laterality: Right;    ROBOTIC ARTHROPLASTY, KNEE Left 5/28/2024    Procedure: ROBOTIC ARTHROPLASTY, KNEE, TOTAL;  Surgeon: Tomas Gordillo MD;  Location: [facility] OR;  Service: Orthopedics;  Laterality: Left;        Medications Ordered Prior to Encounter[1]     Review of patient's allergies indicates:   Allergen Reactions    Amlodipine Swelling     Other reaction(s): Not available    Adhesive tape-silicones Other (See Comments)     Redness,peeling of skin, burns     Statins-hmg-coa reductase inhibitors Other (See Comments)     Joint pain    Cephalexin Diarrhea and Nausea Only       Objective:    There were no vitals filed for this visit.     Physical Exam:  Left lower extremity compartments are soft and warm.  There are no signs or symptoms of  DVT or infection. Incisions are well-healed.  Knee ROM is 0-120 degrees today.  The patient is not tender to palpation. The patella is tracking appropriately. The knee is stable to stressing. Neurovascularly intact distally.      X-rays: 3 views of the left knee demonstrate a well aligned total knee arthroplasty without evidence of loosening or infection.        Assessment:  1. History of total knee replacement, left  - X-Ray Knee 3 View Left; Future       Plan:  Assessment & Plan    ARTIFICIAL KNEE JOINTS:   Use antibiotics before any dental work, bowel or bladder procedures, catheterizations, or colonoscopies for 2 years after surgery to prevent infection.   Inform healthcare providers about artificial knees before any procedures and inquire about the need for antibiotics.   Contact the office if there are any questions about antibiotic needs for procedures.    FOLLOW-UP:   Follow up only if needed.          Follow up: Follow up if symptoms worsen or fail to improve.          This note was generated with the assistance of ambient listening technology. Verbal consent was obtained by the patient and accompanying visitor(s) for the recording of patient appointment to facilitate this note. I attest to having reviewed and edited the generated note for accuracy, though some syntax or spelling errors may persist. Please contact the author of this note for any clarification.            [1]   Current Outpatient Medications on File Prior to Visit   Medication Sig Dispense Refill    anastrozole (ARIMIDEX) 1 mg Tab Take 1 mg by mouth once daily.      benazepriL (LOTENSIN) 20 MG tablet Take 20 mg by mouth once daily.      cholecalciferol, vitamin D3, 1,250 mcg (50,000 unit) capsule Take 50,000 Units by mouth every 7 days.      diltiaZEM (CARDIZEM CD) 360 MG 24 hr capsule Take 360 mg by mouth every morning.      fluticasone propionate (FLONASE) 50 mcg/actuation nasal spray 1 spray by Each Nostril route as needed.       hydrALAZINE (APRESOLINE) 25 MG tablet Take 37.5 mg by mouth 2 (two) times daily.      isosorbide dinitrate (ISORDIL) 20 MG tablet Take 20 mg by mouth 2 (two) times daily.      omeprazole (PRILOSEC) 40 MG capsule Take 1 capsule every day by oral route at bedtime for 90 days, for GERD.      WEGOVY 0.25 mg/0.5 mL PnIj Inject 0.5 mLs into the skin every 7 days. (Patient not taking: Reported on 6/13/2024)       Current Facility-Administered Medications on File Prior to Visit   Medication Dose Route Frequency Provider Last Rate Last Admin    betamethasone acetate-betamethasone sodium phosphate injection 12 mg  12 mg Intramuscular  Nava Granado PA-C   12 mg at 09/15/22 0915    betamethasone acetate-betamethasone sodium phosphate injection 12 mg  12 mg Intramuscular  Nava Granado PA-C   12 mg at 09/15/22 0915    LIDOcaine HCL 20 mg/ml (2%) injection 3 mg  3 mg   Nava Granado PA-C   3 mg at 09/15/22 0915    LIDOcaine HCL 20 mg/ml (2%) injection 3 mg  3 mg   Nava Granado PA-C   3 mg at 09/15/22 0915

## (undated) DEVICE — DRAPE MEDIUM SHEET 40X70IN

## (undated) DEVICE — CUSHION  WC FOAM 20X20X.75IN

## (undated) DEVICE — Device

## (undated) DEVICE — SOL NACL IRR 1000ML BTL

## (undated) DEVICE — GLOVE SIGNATURE ESSNTL LTX 6.5

## (undated) DEVICE — PIN BONE 3.2X110MM
Type: IMPLANTABLE DEVICE | Site: KNEE | Status: NON-FUNCTIONAL
Removed: 2023-12-01

## (undated) DEVICE — NDL ECLIPSE SAFETY 18GX1-1/2IN

## (undated) DEVICE — SOL NACL IRR 3000ML

## (undated) DEVICE — GLOVE SENSICARE PI GRN 8.5

## (undated) DEVICE — KIT VIZADISC KNEE TRACKING

## (undated) DEVICE — GAUZE SPONGE 4X4 12PLY

## (undated) DEVICE — SYS CARTRIDGE MIXI PRISM II

## (undated) DEVICE — APPLICATOR CHLORAPREP ORN 26ML

## (undated) DEVICE — PAD ABD 8X10 STERILE

## (undated) DEVICE — GLOVE SENSICARE PI MICRO 8

## (undated) DEVICE — HANDLE DEVON RIGID OR LIGHT

## (undated) DEVICE — PADDING WYTEX UNDRCST 6INX4YD

## (undated) DEVICE — COVER TABLE HVY DTY 60X90IN

## (undated) DEVICE — DEVICE STRATAFIX SYMMETRIC +

## (undated) DEVICE — GLOVE SENSICARE PI GRN 6.5

## (undated) DEVICE — BLADE SURG STAINLESS STEEL #10

## (undated) DEVICE — BLADE SURG STAINLESS STEEL #15

## (undated) DEVICE — DRAPE STERI U-SHAPED 47X51IN

## (undated) DEVICE — BLADE MAKO NARROW

## (undated) DEVICE — KIT CHECKPOINT TIBIAL

## (undated) DEVICE — DRESSING XEROFORM FOIL PK 1X8

## (undated) DEVICE — SUT VICRYL PLUS 2-0 CT1 18

## (undated) DEVICE — KIT TRIATHLON CR FEM PREP SZ1

## (undated) DEVICE — SPONGE COTTON TRAY 4X4IN

## (undated) DEVICE — SUT ETHIBOND EXCEL 1 CTX 18

## (undated) DEVICE — DRAPE FULL SHEET 70X100IN

## (undated) DEVICE — KIT TRIATHLON CR TIB PREP SZ1

## (undated) DEVICE — CUFF ATS 2 PORT SNGL BLDR 34IN

## (undated) DEVICE — WRAP DEMAYO LEG STERILE

## (undated) DEVICE — KIT DRAPE RIO ONE PIECE W/POCK

## (undated) DEVICE — PAD ABDOMINAL STERILE 8X10IN

## (undated) DEVICE — KIT TRIATHLON TIBIAL SIZER

## (undated) DEVICE — KIT TRIATHLON CR TIB PREP SZ2

## (undated) DEVICE — SYS IRRISEPT 450ML0.05% CHG

## (undated) DEVICE — GOWN POLY REINF X-LONG 2XL

## (undated) DEVICE — SOL POVIDONE IODINE PCH 3/4OZ

## (undated) DEVICE — BLADE MAKO STANDARD

## (undated) DEVICE — PIN BONE 4 X 140MM STERILE
Type: IMPLANTABLE DEVICE | Site: KNEE | Status: NON-FUNCTIONAL
Removed: 2024-05-28

## (undated) DEVICE — PIN BONE 4 X 140MM STERILE
Type: IMPLANTABLE DEVICE | Site: KNEE | Status: NON-FUNCTIONAL
Removed: 2023-12-01

## (undated) DEVICE — TAPE SILK 3IN

## (undated) DEVICE — ELECTRODE PATIENT RETURN DISP

## (undated) DEVICE — KIT SURGICAL TURNOVER

## (undated) DEVICE — BLADE SAG DUAL CUT 18X90X1.35

## (undated) DEVICE — PIN BONE 3.2X110MM
Type: IMPLANTABLE DEVICE | Site: KNEE | Status: NON-FUNCTIONAL
Removed: 2024-05-28

## (undated) DEVICE — DRESSING XEROFORM 1X8IN

## (undated) DEVICE — DRESSING XEROFORM NONADH 1X8IN